# Patient Record
Sex: MALE | Race: WHITE | NOT HISPANIC OR LATINO | Employment: OTHER | URBAN - METROPOLITAN AREA
[De-identification: names, ages, dates, MRNs, and addresses within clinical notes are randomized per-mention and may not be internally consistent; named-entity substitution may affect disease eponyms.]

---

## 2024-05-10 ENCOUNTER — ANESTHESIA (OUTPATIENT)
Dept: SURGERY | Facility: CLINIC | Age: 64
DRG: 513 | End: 2024-05-10
Payer: COMMERCIAL

## 2024-05-10 ENCOUNTER — HOSPITAL ENCOUNTER (INPATIENT)
Facility: CLINIC | Age: 64
LOS: 3 days | Discharge: HOME HEALTH | DRG: 513 | End: 2024-05-13
Attending: STUDENT IN AN ORGANIZED HEALTH CARE EDUCATION/TRAINING PROGRAM | Admitting: INTERNAL MEDICINE
Payer: COMMERCIAL

## 2024-05-10 ENCOUNTER — ANESTHESIA EVENT (OUTPATIENT)
Dept: SURGERY | Facility: CLINIC | Age: 64
DRG: 513 | End: 2024-05-10
Payer: COMMERCIAL

## 2024-05-10 DIAGNOSIS — M79.645 PAIN IN FINGER OF LEFT HAND: ICD-10-CM

## 2024-05-10 DIAGNOSIS — M65.949 FLEXOR TENOSYNOVITIS OF FINGER: Primary | ICD-10-CM

## 2024-05-10 LAB
ANION GAP SERPL CALCULATED.3IONS-SCNC: 12 MMOL/L (ref 7–15)
BASOPHILS # BLD AUTO: 0 10E3/UL (ref 0–0.2)
BASOPHILS NFR BLD AUTO: 0 %
BUN SERPL-MCNC: 17.3 MG/DL (ref 8–23)
CALCIUM SERPL-MCNC: 9.6 MG/DL (ref 8.8–10.2)
CHLORIDE SERPL-SCNC: 103 MMOL/L (ref 98–107)
CREAT SERPL-MCNC: 0.87 MG/DL (ref 0.67–1.17)
DEPRECATED HCO3 PLAS-SCNC: 23 MMOL/L (ref 22–29)
EGFRCR SERPLBLD CKD-EPI 2021: >90 ML/MIN/1.73M2
EOSINOPHIL # BLD AUTO: 0.2 10E3/UL (ref 0–0.7)
EOSINOPHIL NFR BLD AUTO: 2 %
ERYTHROCYTE [DISTWIDTH] IN BLOOD BY AUTOMATED COUNT: 13.1 % (ref 10–15)
GLUCOSE SERPL-MCNC: 104 MG/DL (ref 70–99)
HCT VFR BLD AUTO: 37 % (ref 40–53)
HGB BLD-MCNC: 13.3 G/DL (ref 13.3–17.7)
IMM GRANULOCYTES # BLD: 0 10E3/UL
IMM GRANULOCYTES NFR BLD: 0 %
LYMPHOCYTES # BLD AUTO: 0.9 10E3/UL (ref 0.8–5.3)
LYMPHOCYTES NFR BLD AUTO: 12 %
MCH RBC QN AUTO: 30.8 PG (ref 26.5–33)
MCHC RBC AUTO-ENTMCNC: 35.9 G/DL (ref 31.5–36.5)
MCV RBC AUTO: 86 FL (ref 78–100)
MONOCYTES # BLD AUTO: 0.7 10E3/UL (ref 0–1.3)
MONOCYTES NFR BLD AUTO: 9 %
NEUTROPHILS # BLD AUTO: 5.4 10E3/UL (ref 1.6–8.3)
NEUTROPHILS NFR BLD AUTO: 77 %
NRBC # BLD AUTO: 0 10E3/UL
NRBC BLD AUTO-RTO: 0 /100
PLATELET # BLD AUTO: 173 10E3/UL (ref 150–450)
POTASSIUM SERPL-SCNC: 3.5 MMOL/L (ref 3.4–5.3)
RBC # BLD AUTO: 4.32 10E6/UL (ref 4.4–5.9)
SODIUM SERPL-SCNC: 138 MMOL/L (ref 135–145)
WBC # BLD AUTO: 7.1 10E3/UL (ref 4–11)

## 2024-05-10 PROCEDURE — 80048 BASIC METABOLIC PNL TOTAL CA: CPT | Performed by: STUDENT IN AN ORGANIZED HEALTH CARE EDUCATION/TRAINING PROGRAM

## 2024-05-10 PROCEDURE — 710N000009 HC RECOVERY PHASE 1, LEVEL 1, PER MIN: Performed by: ORTHOPAEDIC SURGERY

## 2024-05-10 PROCEDURE — 250N000009 HC RX 250: Performed by: ORTHOPAEDIC SURGERY

## 2024-05-10 PROCEDURE — 87205 SMEAR GRAM STAIN: CPT | Performed by: ORTHOPAEDIC SURGERY

## 2024-05-10 PROCEDURE — 99222 1ST HOSP IP/OBS MODERATE 55: CPT | Performed by: INTERNAL MEDICINE

## 2024-05-10 PROCEDURE — 250N000011 HC RX IP 250 OP 636: Performed by: ANESTHESIOLOGY

## 2024-05-10 PROCEDURE — 87075 CULTR BACTERIA EXCEPT BLOOD: CPT | Performed by: ORTHOPAEDIC SURGERY

## 2024-05-10 PROCEDURE — 272N000001 HC OR GENERAL SUPPLY STERILE: Performed by: ORTHOPAEDIC SURGERY

## 2024-05-10 PROCEDURE — 258N000003 HC RX IP 258 OP 636: Performed by: NURSE ANESTHETIST, CERTIFIED REGISTERED

## 2024-05-10 PROCEDURE — 250N000009 HC RX 250: Performed by: NURSE ANESTHETIST, CERTIFIED REGISTERED

## 2024-05-10 PROCEDURE — 250N000011 HC RX IP 250 OP 636: Performed by: INTERNAL MEDICINE

## 2024-05-10 PROCEDURE — 250N000011 HC RX IP 250 OP 636: Performed by: NURSE ANESTHETIST, CERTIFIED REGISTERED

## 2024-05-10 PROCEDURE — 250N000013 HC RX MED GY IP 250 OP 250 PS 637: Performed by: ANESTHESIOLOGY

## 2024-05-10 PROCEDURE — 120N000004 HC R&B MS OVERFLOW

## 2024-05-10 PROCEDURE — 250N000011 HC RX IP 250 OP 636: Performed by: STUDENT IN AN ORGANIZED HEALTH CARE EDUCATION/TRAINING PROGRAM

## 2024-05-10 PROCEDURE — 360N000076 HC SURGERY LEVEL 3, PER MIN: Performed by: ORTHOPAEDIC SURGERY

## 2024-05-10 PROCEDURE — 250N000025 HC SEVOFLURANE, PER MIN: Performed by: ORTHOPAEDIC SURGERY

## 2024-05-10 PROCEDURE — 250N000011 HC RX IP 250 OP 636: Performed by: ORTHOPAEDIC SURGERY

## 2024-05-10 PROCEDURE — 258N000003 HC RX IP 258 OP 636: Performed by: ANESTHESIOLOGY

## 2024-05-10 PROCEDURE — 99285 EMERGENCY DEPT VISIT HI MDM: CPT | Mod: 25

## 2024-05-10 PROCEDURE — 0LB80ZZ EXCISION OF LEFT HAND TENDON, OPEN APPROACH: ICD-10-PCS | Performed by: ORTHOPAEDIC SURGERY

## 2024-05-10 PROCEDURE — 85025 COMPLETE CBC W/AUTO DIFF WBC: CPT | Performed by: STUDENT IN AN ORGANIZED HEALTH CARE EDUCATION/TRAINING PROGRAM

## 2024-05-10 PROCEDURE — 87070 CULTURE OTHR SPECIMN AEROBIC: CPT | Performed by: ORTHOPAEDIC SURGERY

## 2024-05-10 PROCEDURE — 999N000141 HC STATISTIC PRE-PROCEDURE NURSING ASSESSMENT: Performed by: ORTHOPAEDIC SURGERY

## 2024-05-10 PROCEDURE — 370N000017 HC ANESTHESIA TECHNICAL FEE, PER MIN: Performed by: ORTHOPAEDIC SURGERY

## 2024-05-10 PROCEDURE — 36415 COLL VENOUS BLD VENIPUNCTURE: CPT | Performed by: STUDENT IN AN ORGANIZED HEALTH CARE EDUCATION/TRAINING PROGRAM

## 2024-05-10 RX ORDER — AMLODIPINE BESYLATE 10 MG/1
10 TABLET ORAL DAILY
Status: DISCONTINUED | OUTPATIENT
Start: 2024-05-11 | End: 2024-05-13 | Stop reason: HOSPADM

## 2024-05-10 RX ORDER — SODIUM CHLORIDE, SODIUM LACTATE, POTASSIUM CHLORIDE, CALCIUM CHLORIDE 600; 310; 30; 20 MG/100ML; MG/100ML; MG/100ML; MG/100ML
INJECTION, SOLUTION INTRAVENOUS CONTINUOUS
Status: DISCONTINUED | OUTPATIENT
Start: 2024-05-10 | End: 2024-05-11 | Stop reason: HOSPADM

## 2024-05-10 RX ORDER — GINSENG 100 MG
CAPSULE ORAL PRN
Status: DISCONTINUED | OUTPATIENT
Start: 2024-05-10 | End: 2024-05-10 | Stop reason: HOSPADM

## 2024-05-10 RX ORDER — LIDOCAINE 40 MG/G
CREAM TOPICAL
Status: DISCONTINUED | OUTPATIENT
Start: 2024-05-10 | End: 2024-05-11

## 2024-05-10 RX ORDER — ONDANSETRON 2 MG/ML
4 INJECTION INTRAMUSCULAR; INTRAVENOUS EVERY 30 MIN PRN
Status: DISCONTINUED | OUTPATIENT
Start: 2024-05-10 | End: 2024-05-11 | Stop reason: HOSPADM

## 2024-05-10 RX ORDER — CEFAZOLIN SODIUM 1 G/3ML
1 INJECTION, POWDER, FOR SOLUTION INTRAMUSCULAR; INTRAVENOUS EVERY 8 HOURS
Status: DISCONTINUED | OUTPATIENT
Start: 2024-05-10 | End: 2024-05-11

## 2024-05-10 RX ORDER — ACETAMINOPHEN 650 MG/1
650 SUPPOSITORY RECTAL EVERY 4 HOURS PRN
Status: DISCONTINUED | OUTPATIENT
Start: 2024-05-10 | End: 2024-05-13 | Stop reason: HOSPADM

## 2024-05-10 RX ORDER — HYDROMORPHONE HCL IN WATER/PF 6 MG/30 ML
0.2 PATIENT CONTROLLED ANALGESIA SYRINGE INTRAVENOUS EVERY 5 MIN PRN
Status: DISCONTINUED | OUTPATIENT
Start: 2024-05-10 | End: 2024-05-11 | Stop reason: HOSPADM

## 2024-05-10 RX ORDER — ACETAMINOPHEN 325 MG/1
650 TABLET ORAL EVERY 4 HOURS PRN
Status: DISCONTINUED | OUTPATIENT
Start: 2024-05-10 | End: 2024-05-11

## 2024-05-10 RX ORDER — ATORVASTATIN CALCIUM 20 MG/1
20 TABLET, FILM COATED ORAL DAILY
COMMUNITY

## 2024-05-10 RX ORDER — HYDROCHLOROTHIAZIDE 25 MG/1
25 TABLET ORAL DAILY
COMMUNITY

## 2024-05-10 RX ORDER — NALOXONE HYDROCHLORIDE 0.4 MG/ML
0.2 INJECTION, SOLUTION INTRAMUSCULAR; INTRAVENOUS; SUBCUTANEOUS
Status: DISCONTINUED | OUTPATIENT
Start: 2024-05-10 | End: 2024-05-13 | Stop reason: HOSPADM

## 2024-05-10 RX ORDER — LIDOCAINE HYDROCHLORIDE 20 MG/ML
INJECTION, SOLUTION INFILTRATION; PERINEURAL PRN
Status: DISCONTINUED | OUTPATIENT
Start: 2024-05-10 | End: 2024-05-10

## 2024-05-10 RX ORDER — PROPOFOL 10 MG/ML
INJECTION, EMULSION INTRAVENOUS PRN
Status: DISCONTINUED | OUTPATIENT
Start: 2024-05-10 | End: 2024-05-10

## 2024-05-10 RX ORDER — DEXAMETHASONE SODIUM PHOSPHATE 4 MG/ML
4 INJECTION, SOLUTION INTRA-ARTICULAR; INTRALESIONAL; INTRAMUSCULAR; INTRAVENOUS; SOFT TISSUE
Status: DISCONTINUED | OUTPATIENT
Start: 2024-05-10 | End: 2024-05-11 | Stop reason: HOSPADM

## 2024-05-10 RX ORDER — FLUTICASONE PROPIONATE AND SALMETEROL 250; 50 UG/1; UG/1
1 POWDER RESPIRATORY (INHALATION) 2 TIMES DAILY
COMMUNITY

## 2024-05-10 RX ORDER — LABETALOL HYDROCHLORIDE 5 MG/ML
10 INJECTION, SOLUTION INTRAVENOUS
Status: COMPLETED | OUTPATIENT
Start: 2024-05-10 | End: 2024-05-10

## 2024-05-10 RX ORDER — HYDROMORPHONE HCL IN WATER/PF 6 MG/30 ML
0.4 PATIENT CONTROLLED ANALGESIA SYRINGE INTRAVENOUS EVERY 5 MIN PRN
Status: DISCONTINUED | OUTPATIENT
Start: 2024-05-10 | End: 2024-05-11 | Stop reason: HOSPADM

## 2024-05-10 RX ORDER — CEFAZOLIN SODIUM 1 G/3ML
INJECTION, POWDER, FOR SOLUTION INTRAMUSCULAR; INTRAVENOUS PRN
Status: DISCONTINUED | OUTPATIENT
Start: 2024-05-10 | End: 2024-05-10

## 2024-05-10 RX ORDER — AMOXICILLIN 250 MG
2 CAPSULE ORAL 2 TIMES DAILY PRN
Status: DISCONTINUED | OUTPATIENT
Start: 2024-05-10 | End: 2024-05-13 | Stop reason: HOSPADM

## 2024-05-10 RX ORDER — ONDANSETRON 4 MG/1
4 TABLET, ORALLY DISINTEGRATING ORAL EVERY 6 HOURS PRN
Status: DISCONTINUED | OUTPATIENT
Start: 2024-05-10 | End: 2024-05-11

## 2024-05-10 RX ORDER — NALOXONE HYDROCHLORIDE 0.4 MG/ML
0.4 INJECTION, SOLUTION INTRAMUSCULAR; INTRAVENOUS; SUBCUTANEOUS
Status: DISCONTINUED | OUTPATIENT
Start: 2024-05-10 | End: 2024-05-13 | Stop reason: HOSPADM

## 2024-05-10 RX ORDER — ALBUTEROL SULFATE 90 UG/1
2 AEROSOL, METERED RESPIRATORY (INHALATION) EVERY 4 HOURS PRN
Status: DISCONTINUED | OUTPATIENT
Start: 2024-05-10 | End: 2024-05-13 | Stop reason: HOSPADM

## 2024-05-10 RX ORDER — VANCOMYCIN HYDROCHLORIDE 1 G/200ML
1000 INJECTION, SOLUTION INTRAVENOUS ONCE
Status: COMPLETED | OUTPATIENT
Start: 2024-05-10 | End: 2024-05-10

## 2024-05-10 RX ORDER — BUPIVACAINE HYDROCHLORIDE 2.5 MG/ML
INJECTION, SOLUTION INFILTRATION; PERINEURAL PRN
Status: DISCONTINUED | OUTPATIENT
Start: 2024-05-10 | End: 2024-05-10 | Stop reason: HOSPADM

## 2024-05-10 RX ORDER — MONTELUKAST SODIUM 10 MG/1
10 TABLET ORAL AT BEDTIME
COMMUNITY

## 2024-05-10 RX ORDER — SULFAMETHOXAZOLE/TRIMETHOPRIM 800-160 MG
1 TABLET ORAL 2 TIMES DAILY
Status: ON HOLD | COMMUNITY
End: 2024-05-13

## 2024-05-10 RX ORDER — ONDANSETRON 2 MG/ML
4 INJECTION INTRAMUSCULAR; INTRAVENOUS EVERY 6 HOURS PRN
Status: DISCONTINUED | OUTPATIENT
Start: 2024-05-10 | End: 2024-05-11

## 2024-05-10 RX ORDER — FENTANYL CITRATE 50 UG/ML
INJECTION, SOLUTION INTRAMUSCULAR; INTRAVENOUS PRN
Status: DISCONTINUED | OUTPATIENT
Start: 2024-05-10 | End: 2024-05-10

## 2024-05-10 RX ORDER — NALOXONE HYDROCHLORIDE 0.4 MG/ML
0.1 INJECTION, SOLUTION INTRAMUSCULAR; INTRAVENOUS; SUBCUTANEOUS
Status: DISCONTINUED | OUTPATIENT
Start: 2024-05-10 | End: 2024-05-11 | Stop reason: HOSPADM

## 2024-05-10 RX ORDER — LISINOPRIL 20 MG/1
20 TABLET ORAL DAILY
COMMUNITY

## 2024-05-10 RX ORDER — HYDRALAZINE HYDROCHLORIDE 20 MG/ML
2.5-5 INJECTION INTRAMUSCULAR; INTRAVENOUS EVERY 10 MIN PRN
Status: DISCONTINUED | OUTPATIENT
Start: 2024-05-10 | End: 2024-05-11 | Stop reason: HOSPADM

## 2024-05-10 RX ORDER — AMLODIPINE BESYLATE 10 MG/1
10 TABLET ORAL DAILY
COMMUNITY

## 2024-05-10 RX ORDER — ALBUTEROL SULFATE 90 UG/1
2 AEROSOL, METERED RESPIRATORY (INHALATION) EVERY 4 HOURS PRN
COMMUNITY

## 2024-05-10 RX ORDER — ONDANSETRON 2 MG/ML
INJECTION INTRAMUSCULAR; INTRAVENOUS PRN
Status: DISCONTINUED | OUTPATIENT
Start: 2024-05-10 | End: 2024-05-10

## 2024-05-10 RX ORDER — LISINOPRIL 20 MG/1
20 TABLET ORAL DAILY
Status: DISCONTINUED | OUTPATIENT
Start: 2024-05-11 | End: 2024-05-13 | Stop reason: HOSPADM

## 2024-05-10 RX ORDER — SODIUM CHLORIDE, SODIUM LACTATE, POTASSIUM CHLORIDE, CALCIUM CHLORIDE 600; 310; 30; 20 MG/100ML; MG/100ML; MG/100ML; MG/100ML
INJECTION, SOLUTION INTRAVENOUS CONTINUOUS PRN
Status: DISCONTINUED | OUTPATIENT
Start: 2024-05-10 | End: 2024-05-10

## 2024-05-10 RX ORDER — OXYCODONE HYDROCHLORIDE 5 MG/1
5 TABLET ORAL EVERY 4 HOURS PRN
Status: DISCONTINUED | OUTPATIENT
Start: 2024-05-10 | End: 2024-05-11

## 2024-05-10 RX ORDER — FENTANYL CITRATE 50 UG/ML
25 INJECTION, SOLUTION INTRAMUSCULAR; INTRAVENOUS EVERY 5 MIN PRN
Status: DISCONTINUED | OUTPATIENT
Start: 2024-05-10 | End: 2024-05-11 | Stop reason: HOSPADM

## 2024-05-10 RX ORDER — DEXAMETHASONE SODIUM PHOSPHATE 4 MG/ML
INJECTION, SOLUTION INTRA-ARTICULAR; INTRALESIONAL; INTRAMUSCULAR; INTRAVENOUS; SOFT TISSUE PRN
Status: DISCONTINUED | OUTPATIENT
Start: 2024-05-10 | End: 2024-05-10

## 2024-05-10 RX ORDER — CEFAZOLIN SODIUM 2 G/100ML
2 INJECTION, SOLUTION INTRAVENOUS ONCE
Status: COMPLETED | OUTPATIENT
Start: 2024-05-10 | End: 2024-05-10

## 2024-05-10 RX ORDER — CALCIUM CARBONATE 500 MG/1
1000 TABLET, CHEWABLE ORAL 4 TIMES DAILY PRN
Status: DISCONTINUED | OUTPATIENT
Start: 2024-05-10 | End: 2024-05-13 | Stop reason: HOSPADM

## 2024-05-10 RX ORDER — AMOXICILLIN 250 MG
1 CAPSULE ORAL 2 TIMES DAILY PRN
Status: DISCONTINUED | OUTPATIENT
Start: 2024-05-10 | End: 2024-05-13 | Stop reason: HOSPADM

## 2024-05-10 RX ORDER — FENTANYL CITRATE 50 UG/ML
50 INJECTION, SOLUTION INTRAMUSCULAR; INTRAVENOUS EVERY 5 MIN PRN
Status: DISCONTINUED | OUTPATIENT
Start: 2024-05-10 | End: 2024-05-11 | Stop reason: HOSPADM

## 2024-05-10 RX ORDER — FLUTICASONE FUROATE AND VILANTEROL 100; 25 UG/1; UG/1
1 POWDER RESPIRATORY (INHALATION) DAILY
Status: DISCONTINUED | OUTPATIENT
Start: 2024-05-10 | End: 2024-05-13 | Stop reason: HOSPADM

## 2024-05-10 RX ORDER — MONTELUKAST SODIUM 10 MG/1
10 TABLET ORAL AT BEDTIME
Status: DISCONTINUED | OUTPATIENT
Start: 2024-05-10 | End: 2024-05-13 | Stop reason: HOSPADM

## 2024-05-10 RX ORDER — ACETAMINOPHEN 325 MG/1
975 TABLET ORAL ONCE
Status: COMPLETED | OUTPATIENT
Start: 2024-05-10 | End: 2024-05-10

## 2024-05-10 RX ORDER — ALBUTEROL SULFATE 0.83 MG/ML
2.5 SOLUTION RESPIRATORY (INHALATION) EVERY 4 HOURS PRN
Status: DISCONTINUED | OUTPATIENT
Start: 2024-05-10 | End: 2024-05-11 | Stop reason: HOSPADM

## 2024-05-10 RX ORDER — ONDANSETRON 4 MG/1
4 TABLET, ORALLY DISINTEGRATING ORAL EVERY 30 MIN PRN
Status: DISCONTINUED | OUTPATIENT
Start: 2024-05-10 | End: 2024-05-11 | Stop reason: HOSPADM

## 2024-05-10 RX ADMIN — LABETALOL HYDROCHLORIDE 10 MG: 5 INJECTION, SOLUTION INTRAVENOUS at 23:21

## 2024-05-10 RX ADMIN — HYDRALAZINE HYDROCHLORIDE 5 MG: 20 INJECTION INTRAMUSCULAR; INTRAVENOUS at 23:18

## 2024-05-10 RX ADMIN — FENTANYL CITRATE 100 MCG: 50 INJECTION INTRAMUSCULAR; INTRAVENOUS at 21:13

## 2024-05-10 RX ADMIN — SODIUM CHLORIDE, POTASSIUM CHLORIDE, SODIUM LACTATE AND CALCIUM CHLORIDE: 600; 310; 30; 20 INJECTION, SOLUTION INTRAVENOUS at 21:08

## 2024-05-10 RX ADMIN — ONDANSETRON 4 MG: 2 INJECTION INTRAMUSCULAR; INTRAVENOUS at 21:56

## 2024-05-10 RX ADMIN — LIDOCAINE HYDROCHLORIDE 50 MG: 20 INJECTION, SOLUTION INFILTRATION; PERINEURAL at 21:13

## 2024-05-10 RX ADMIN — VANCOMYCIN HYDROCHLORIDE 1000 MG: 1 INJECTION, SOLUTION INTRAVENOUS at 22:48

## 2024-05-10 RX ADMIN — ACETAMINOPHEN 975 MG: 325 TABLET, FILM COATED ORAL at 22:55

## 2024-05-10 RX ADMIN — CEFAZOLIN 2 G: 1 INJECTION, POWDER, FOR SOLUTION INTRAMUSCULAR; INTRAVENOUS at 21:49

## 2024-05-10 RX ADMIN — HYDRALAZINE HYDROCHLORIDE 5 MG: 20 INJECTION INTRAMUSCULAR; INTRAVENOUS at 22:50

## 2024-05-10 RX ADMIN — HYDRALAZINE HYDROCHLORIDE 5 MG: 20 INJECTION INTRAMUSCULAR; INTRAVENOUS at 22:29

## 2024-05-10 RX ADMIN — DEXAMETHASONE SODIUM PHOSPHATE 8 MG: 4 INJECTION, SOLUTION INTRA-ARTICULAR; INTRALESIONAL; INTRAMUSCULAR; INTRAVENOUS; SOFT TISSUE at 21:13

## 2024-05-10 RX ADMIN — CEFAZOLIN SODIUM 2 G: 2 INJECTION, SOLUTION INTRAVENOUS at 15:02

## 2024-05-10 RX ADMIN — SODIUM CHLORIDE, POTASSIUM CHLORIDE, SODIUM LACTATE AND CALCIUM CHLORIDE: 600; 310; 30; 20 INJECTION, SOLUTION INTRAVENOUS at 22:47

## 2024-05-10 RX ADMIN — HYDRALAZINE HYDROCHLORIDE 5 MG: 20 INJECTION INTRAMUSCULAR; INTRAVENOUS at 23:08

## 2024-05-10 RX ADMIN — PROPOFOL 200 MG: 10 INJECTION, EMULSION INTRAVENOUS at 21:13

## 2024-05-10 ASSESSMENT — ACTIVITIES OF DAILY LIVING (ADL)
ADLS_ACUITY_SCORE: 35
ADLS_ACUITY_SCORE: 33
ADLS_ACUITY_SCORE: 35

## 2024-05-10 ASSESSMENT — COLUMBIA-SUICIDE SEVERITY RATING SCALE - C-SSRS
6. HAVE YOU EVER DONE ANYTHING, STARTED TO DO ANYTHING, OR PREPARED TO DO ANYTHING TO END YOUR LIFE?: NO
2. HAVE YOU ACTUALLY HAD ANY THOUGHTS OF KILLING YOURSELF IN THE PAST MONTH?: NO
1. IN THE PAST MONTH, HAVE YOU WISHED YOU WERE DEAD OR WISHED YOU COULD GO TO SLEEP AND NOT WAKE UP?: NO

## 2024-05-10 NOTE — PHARMACY-ADMISSION MEDICATION HISTORY
Pharmacy Intern Admission Medication History    Admission medication history is complete. The information provided in this note is only as accurate as the sources available at the time of the update.    Information Source(s): Patient, Family member, and CareEverywhere/SureScripts via in-person    Pertinent Information: Med list was collected from pt's wife. Today is the 3rd day of his BACTRIM 800-160 MG tab.    Changes made to PTA medication list:  Added: Whole list  Deleted: None  Changed: None    Allergies reviewed with patient and updates made in EHR: yes    Medication History Completed By: Nas Stevens 5/10/2024 4:17 PM    PTA Med List   Medication Sig Last Dose    albuterol (PROAIR HFA/PROVENTIL HFA/VENTOLIN HFA) 108 (90 Base) MCG/ACT inhaler Inhale 2 puffs into the lungs every 4 hours as needed for shortness of breath, wheezing or cough Unknown at -    amLODIPine (NORVASC) 10 MG tablet Take 10 mg by mouth daily 5/10/2024 at am    atorvastatin (LIPITOR) 20 MG tablet Take 20 mg by mouth daily 5/10/2024 at am    fluticasone-salmeterol (ADVAIR) 250-50 MCG/ACT inhaler Inhale 1 puff into the lungs 2 times daily 5/10/2024 at am    hydrochlorothiazide (HYDRODIURIL) 25 MG tablet Take 25 mg by mouth daily 5/10/2024 at am    lisinopril (ZESTRIL) 20 MG tablet Take 20 mg by mouth daily 5/10/2024 at am    montelukast (SINGULAIR) 10 MG tablet Take 10 mg by mouth at bedtime 5/9/2024 at pm    sulfamethoxazole-trimethoprim (BACTRIM DS) 800-160 MG tablet Take 1 tablet by mouth 2 times daily 5/10/2024 at am

## 2024-05-10 NOTE — ED NOTES
"[facility]  ED Nurse Handoff Report    ED Chief complaint: Hand Injury  . ED Diagnosis:   Final diagnoses:   None       Allergies: No Known Allergies    Code Status: Full Code    Activity level - Baseline/Home:  independent.  Activity Level - Current:   independent.   Lift room needed: No.   Bariatric: No   Needed: No   Isolation: No.   Infection: Not Applicable.     Respiratory status: Room air    Vital Signs (within 30 minutes):   Vitals:    05/10/24 1327 05/10/24 1500   BP: (!) 171/89 (!) 144/78   Pulse: 95    Resp: 18 16   Temp: 98.5  F (36.9  C)    TempSrc: Oral    SpO2: 97% 99%   Weight: 99.2 kg (218 lb 11.1 oz)    Height: 1.778 m (5' 10\")        Cardiac Rhythm:  ,      Pain level:    Patient confused: No.   Patient Falls Risk: patient and family education.   Elimination Status: Has voided     Patient Report - Initial Complaint: Left hand swelling and pain after nail in thumb.   Focused Assessment: left thumb red and swollen     Abnormal Results:   Labs Ordered and Resulted from Time of ED Arrival to Time of ED Departure   BASIC METABOLIC PANEL - Abnormal       Result Value    Sodium 138      Potassium 3.5      Chloride 103      Carbon Dioxide (CO2) 23      Anion Gap 12      Urea Nitrogen 17.3      Creatinine 0.87      GFR Estimate >90      Calcium 9.6      Glucose 104 (*)    CBC WITH PLATELETS AND DIFFERENTIAL - Abnormal    WBC Count 7.1      RBC Count 4.32 (*)     Hemoglobin 13.3      Hematocrit 37.0 (*)     MCV 86      MCH 30.8      MCHC 35.9      RDW 13.1      Platelet Count 173      % Neutrophils 77      % Lymphocytes 12      % Monocytes 9      % Eosinophils 2      % Basophils 0      % Immature Granulocytes 0      NRBCs per 100 WBC 0      Absolute Neutrophils 5.4      Absolute Lymphocytes 0.9      Absolute Monocytes 0.7      Absolute Eosinophils 0.2      Absolute Basophils 0.0      Absolute Immature Granulocytes 0.0      Absolute NRBCs 0.0          No orders to display "       Treatments provided: Abx, labs  Family Comments: NA  OBS brochure/video discussed/provided to patient:  No  ED Medications:   Medications   ceFAZolin (ANCEF) 2 g in 100 mL D5W intermittent infusion (2 g Intravenous $New Bag 5/10/24 0470)       Drips infusing:  No  For the majority of the shift this patient was Green.   Interventions performed were Abx.    Sepsis treatment initiated: No    Cares/treatment/interventions/medications to be completed following ED care: IV abx    ED Nurse Name: Vicky Mercado RN  3:03 PM  RECEIVING UNIT ED HANDOFF REVIEW    Above ED Nurse Handoff Report was reviewed: Yes  Reviewed by: Liz Schwartz RN on May 10, 2024 at 5:49 PM   I Gaston called the ED to inform them the note was read: Yes

## 2024-05-10 NOTE — PROGRESS NOTES
"Provided cares for pt from [id number]. Pt a/o x4. Denied pain. L pointer finger ands hand swollen and red. Plan to treat with IV abx. Independent, pt voided.     BP (!) 145/85 (BP Location: Right arm, Patient Position: Semi-Wang's, Cuff Size: Adult Regular)   Pulse 79   Temp 98.2  F (36.8  C) (Oral)   Resp 18   Ht 1.778 m (5' 10\")   Wt 99.2 kg (218 lb 11.1 oz)   SpO2 98%   BMI 31.38 kg/m           "

## 2024-05-10 NOTE — ED TRIAGE NOTES
Patient states on Monday he poked himself in his left hand with a nail. Patient states his hand swelled so he went to  who gave him antibiotics. Patient went for a recheck today and it has not gotten better.      Triage Assessment (Adult)       Row Name 05/10/24 1326          Triage Assessment    Airway WDL WDL        Respiratory WDL    Respiratory WDL WDL        Peripheral/Neurovascular WDL    Peripheral Neurovascular WDL WDL

## 2024-05-10 NOTE — H&P
[facility]    History and Physical - Hospitalist Service       Date of Admission:  5/10/2024    Assessment & Plan      Ge Collado is a 63 year old male with background history of hypertension, dyslipidemia, fatty liver disease, bronchial asthma, who presented here in the emergency room earlier due to increasing swelling and pain on his left hand over the course of several days duration.    Problem list:     #Concerns for flexor tenosynovitis of finger  #Left hand swelling  #Recent left hand injury with failed outpatient antibiotics approach with suspicion of underlying soft tissue injury and infection    -Patient failed outpatient approach with oral antibiotics as previously prescribed Bactrim  -Fortunately no clear evidence of systemic signs and symptoms of sepsis or worsening systemic infection  -Optimize pain control  -As needed APAP, minimize narcotics if able  -IV antibiotics will cover with Ancef  -Case was discussed with orthopedic service with plans for close monitoring, IV antibiotics and reevaluation in the next day if pursuing and needing operative intervention or approach  -Admit as inpatient  -Keep him n.p.o. effective midnight until reassessed and seen by orthopedics hand service    Addendum: seen by ortho and subsequently underwent irrigation and debridement of the flexor sheath      #Benign essential hypertension  -Currently hypertensive  -Will be able to resume his home regimen of ACE inhibitors, diuretics and amlodipine    #History of bronchial asthma  -Resume home inhalers and montelukast    #History of dyslipidemia  -On home regimen of statins     Diet: NPO per Anesthesia Guidelines for Procedure/Surgery Except for: Meds    DVT Prophylaxis: Pneumatic Compression Devices and Ambulate every shift  Alvares Catheter: Not present  Lines: None     Cardiac Monitoring: None  Code Status:  full    Clinically Significant Risk Factors Present on Admission                  #  "Hypertension: Home medication list includes antihypertensive(s)      # Obesity: Estimated body mass index is 31.38 kg/m  as calculated from the following:    Height as of this encounter: 1.778 m (5' 10\").    Weight as of this encounter: 99.2 kg (218 lb 11.1 oz).              Disposition Plan     Medically Ready for Discharge: Anticipated in 2-4 Days           Hilario Torres MD, MD  Hospitalist Service  [facility]  Securely message with [employer] (more info)  Text page via [facility] Paging/Directory     ______________________________________________________________________    Chief Complaint     Left hand pain and swelling    History is obtained from the patient  Discussion with emergency room physician  Review of electronic medical records    History of Present Illness   Ge Collado is a 63 year old male with background history of hypertension, dyslipidemia, fatty liver disease, bronchial asthma, who presented here in the emergency room earlier due to increasing swelling and pain on his left hand over the course of several days duration.  He mentioned that unfortunately he sustained a hand injury while working on a deck and a nail injured his left hand.  He was seen initially in urgent care setting and was provided with oral antibiotics with Bactrim.  He presented back for follow-up in the outpatient setting but due to worsening swelling on and not much improvement with oral antibiotics he was sent to the emergency room for further evaluation and care.  He endorses no ongoing nausea, vomiting, fevers, chills, mental status changes, shortness of breath, chest pain, abdominal pain, diarrhea nor any focal weakness.   Upon presentation he demonstrated stable hemodynamics with blood pressure levels at the hypertensive side, afebrile, and not requiring any oxygen support.  Initial labs did show reassuring complete blood count with no significant leukocytosis, normal metabolic panel.  Subsequent " imaging did not reveal any acute fracture or cortical destruction.    His case was discussed by emergency room service with orthopedic hand service with recommendations to continue IV antibiotics, formal evaluation and further monitoring if needing surgical approach and intervention hence this referral to our service for further care with hospitalization.            Past Medical History    As listed above    Past Surgical History   Previous mastectomy, right shoulder surgery    Prior to Admission Medications   Prior to Admission Medications   Prescriptions Last Dose Informant Patient Reported? Taking?   albuterol (PROAIR HFA/PROVENTIL HFA/VENTOLIN HFA) 108 (90 Base) MCG/ACT inhaler Unknown at -  Yes Yes   Sig: Inhale 2 puffs into the lungs every 4 hours as needed for shortness of breath, wheezing or cough   amLODIPine (NORVASC) 10 MG tablet 5/10/2024 at am  Yes Yes   Sig: Take 10 mg by mouth daily   atorvastatin (LIPITOR) 20 MG tablet 5/10/2024 at am  Yes Yes   Sig: Take 20 mg by mouth daily   fluticasone-salmeterol (ADVAIR) 250-50 MCG/ACT inhaler 5/10/2024 at am  Yes Yes   Sig: Inhale 1 puff into the lungs 2 times daily   hydrochlorothiazide (HYDRODIURIL) 25 MG tablet 5/10/2024 at am  Yes Yes   Sig: Take 25 mg by mouth daily   lisinopril (ZESTRIL) 20 MG tablet 5/10/2024 at am  Yes Yes   Sig: Take 20 mg by mouth daily   montelukast (SINGULAIR) 10 MG tablet 5/9/2024 at pm  Yes Yes   Sig: Take 10 mg by mouth at bedtime   sulfamethoxazole-trimethoprim (BACTRIM DS) 800-160 MG tablet 5/10/2024 at am  Yes Yes   Sig: Take 1 tablet by mouth 2 times daily      Facility-Administered Medications: None        Review of Systems    The 10 point Review of Systems is negative other than noted in the HPI or here.     Social History   I have reviewed this patient's social history and updated it with pertinent information if needed.         Family History           Allergies   No Known Allergies     Physical Exam   Vital Signs: Temp:  98.5  F (36.9  C) Temp src: Oral BP: (!) 144/78 Pulse: 95   Resp: 16 SpO2: 99 %      Weight: 218 lbs 11.14 oz    HEENT; Atraumatic, normocephalic, pinkish conjuctiva, pupils bilateral reactive   Skin: warm and moist, no rashes  Lungs: equal chest expansion, clear to auscultation, no wheezes, no stridor, no crackles,   Heart: normal rate, normal rhythm, no rubs or gallops.   Abdomen: normal bowel sounds, no tenderness, no peritoneal signs, no guarding  Extremities: no deformities,   I will refer you to the photographs as taken during early part of hospitalization for other details of his left hand                  Neuro; follow commands, alert and oriented x3, spontaneous speech, coherent, moves all extremities spontaneously  Psych; no hallucination, euthymic mood, not agitated      Medical Decision Making       45 MINUTES SPENT BY ME on the date of service doing chart review, history, exam, documentation & further activities per the note.  MANAGEMENT DISCUSSED with the following over the past 24 hours: Yes   NOTE(S)/MEDICAL RECORDS REVIEWED over the past 24 hours: Yes       Data     I have personally reviewed the following data over the past 24 hrs:    7.1  \   13.3   / 173     138 103 17.3 /  104 (H)   3.5 23 0.87 \       Imaging results reviewed over the past 24 hrs:   No results found for this or any previous visit (from the past 24 hour(s)).

## 2024-05-10 NOTE — ED PROVIDER NOTES
"  Emergency Department Note      History of Present Illness     Chief Complaint  Hand Injury      HPI  Ge Collado is a delightful 63 year old male with HTN and hyperlipidemia presenting with a hand injury. The patient states that he was speared with a nail into the palmar aspect of his left hand near his index finger on Monday. He went into urgent care and placed on antibiotics, 10 day course of Bactrim and intramuscular ceftriaxone, 1 g. He went back in today and they said he had to come to the ED. He states that the nail was valorie from a deck he was tearing apart. He states that the left second finger has been getting more red swollen throughout the week. He denies any fever or chills. He is right handed. He states his last tetanus was 2023.         Independent Historian  None. Only the patient provided history.    Review of External Notes  I personally reviewed notes from the patient's urgent care visit dated today. This provided me with information regarding patient's recent clinical course.  I personally reviewed notes from the patient's urgent care visit dated  5/8/2024 . This provided me with information regarding patient's initial presentation at urgent care.   Past Medical History   Medical History and Problem List  Colon polyp  HTN  Fatty liver disease  Hyperlipidemia  Asthma  Rosacea    Medications  Amlodipine  Lipitor  Albuterol  lisinopril    Surgical History   Right shoulder surgery  Vasectomy  Menisectomy  Physical Exam   Patient Vitals for the past 24 hrs:   BP Temp Temp src Pulse Resp SpO2 Height Weight   05/10/24 1830 (!) 166/92 98  F (36.7  C) -- 84 20 97 % -- --   05/10/24 1817 (!) 145/85 98.2  F (36.8  C) Oral 79 18 98 % -- --   05/10/24 1500 (!) 144/78 -- -- -- 16 99 % -- --   05/10/24 1327 (!) 171/89 98.5  F (36.9  C) Oral 95 18 97 % 1.778 m (5' 10\") 99.2 kg (218 lb 11.1 oz)      Physical Exam   Male appearing stated age, seated in chair.  Left index finger is swollen, erythematous, " tender to palpation over the flexor aspect of the finger, and with reduced range of motion secondary to edema.  Maximal range of motion as pictured in the first image below.  There is a small puncture wound over the dorsal aspect of the left palm, over the flexor tendon of the left index finger.  Patient has normal strength and sensation and brisk capillary refill in the finger.                      Diagnostics   Lab Results   Labs Ordered and Resulted from Time of ED Arrival to Time of ED Departure   BASIC METABOLIC PANEL - Abnormal       Result Value    Sodium 138      Potassium 3.5      Chloride 103      Carbon Dioxide (CO2) 23      Anion Gap 12      Urea Nitrogen 17.3      Creatinine 0.87      GFR Estimate >90      Calcium 9.6      Glucose 104 (*)    CBC WITH PLATELETS AND DIFFERENTIAL - Abnormal    WBC Count 7.1      RBC Count 4.32 (*)     Hemoglobin 13.3      Hematocrit 37.0 (*)     MCV 86      MCH 30.8      MCHC 35.9      RDW 13.1      Platelet Count 173      % Neutrophils 77      % Lymphocytes 12      % Monocytes 9      % Eosinophils 2      % Basophils 0      % Immature Granulocytes 0      NRBCs per 100 WBC 0      Absolute Neutrophils 5.4      Absolute Lymphocytes 0.9      Absolute Monocytes 0.7      Absolute Eosinophils 0.2      Absolute Basophils 0.0      Absolute Immature Granulocytes 0.0      Absolute NRBCs 0.0         EKG   No ECG performed.      Independent Interpretation  None  ED Course    Medications Administered  Medications   fluticasone-vilanterol (BREO ELLIPTA) 100-25 MCG/ACT inhaler 1 puff (has no administration in time range)   montelukast (SINGULAIR) tablet 10 mg (has no administration in time range)   lisinopril (ZESTRIL) tablet 20 mg (has no administration in time range)   amLODIPine (NORVASC) tablet 10 mg (has no administration in time range)   albuterol (PROVENTIL HFA/VENTOLIN HFA) inhaler (has no administration in time range)   lidocaine 1 % 0.1-1 mL (has no administration in time range)    lidocaine (LMX4) cream (has no administration in time range)   sodium chloride (PF) 0.9% PF flush 3 mL (has no administration in time range)   sodium chloride (PF) 0.9% PF flush 3 mL (has no administration in time range)   senna-docusate (SENOKOT-S/PERICOLACE) 8.6-50 MG per tablet 1 tablet (has no administration in time range)     Or   senna-docusate (SENOKOT-S/PERICOLACE) 8.6-50 MG per tablet 2 tablet (has no administration in time range)   calcium carbonate (TUMS) chewable tablet 1,000 mg (has no administration in time range)   acetaminophen (TYLENOL) tablet 650 mg (has no administration in time range)     Or   acetaminophen (TYLENOL) Suppository 650 mg (has no administration in time range)   oxyCODONE IR (ROXICODONE) half-tab 2.5 mg (has no administration in time range)   oxyCODONE (ROXICODONE) tablet 5 mg (has no administration in time range)   ondansetron (ZOFRAN ODT) ODT tab 4 mg (has no administration in time range)     Or   ondansetron (ZOFRAN) injection 4 mg (has no administration in time range)   ceFAZolin (ANCEF) 1 g vial to attach to  ml bag for ADULT or 50 ml bag for PEDS (has no administration in time range)   naloxone (NARCAN) injection 0.2 mg (has no administration in time range)     Or   naloxone (NARCAN) injection 0.4 mg (has no administration in time range)     Or   naloxone (NARCAN) injection 0.2 mg (has no administration in time range)     Or   naloxone (NARCAN) injection 0.4 mg (has no administration in time range)   ceFAZolin (ANCEF) 2 g in 100 mL D5W intermittent infusion (2 g Intravenous $New Bag 5/10/24 1501)       Procedures  Procedures   None performed    Discussion of Management  I discussed the patient's presentation, workup, assessment, plan and disposition with hospitalist Dr. Torres.    Social Determinants of Health adding to complexity of care  None.      ED Course  ED Course as of 05/10/24 1904   Fri May 10, 2024   1410 Obtained the patients history and performed initial  exam   1416 Spoke to Jeannine the PA from [facility] about the patients findings and next steps   1448 Jeannine told me that the surgeon wanted ancef   1623 I spoke to the hospitalist, Dr. Torres, who has agreed to admit the patient for continued evaluation and treatment.     Medical Decision Making / Diagnosis   CMS Diagnoses: None    MIPS     None    MDM  Patient presenting with swelling, redness and pain of the left index finger.  Vital signs are reassuring.  Clinical concern for flexor tenosynovitis with failure of outpatient therapy of ceftriaxone and Bactrim.  Consulted hand surgery, who recommended patient be observed in the hospital overnight with IV antibiotic therapy, cefazolin.  Patient has no leukocytosis, no SIRS criteria.  Patient was admitted to hospitalist for further evaluation and management.       Disposition  The patient was admitted to the hospital under the care of Dr. Torres.     ICD-10 Codes:    ICD-10-CM    1. Flexor tenosynovitis of finger  M65.9       2. Pain in finger of left hand  M79.645              [facility]  [employer]        Chano Ferrell MD  05/10/24 1980

## 2024-05-11 LAB
GLUCOSE BLDC GLUCOMTR-MCNC: 148 MG/DL (ref 70–99)
GRAM STAIN RESULT: NORMAL
GRAM STAIN RESULT: NORMAL
HGB BLD-MCNC: 13.2 G/DL (ref 13.3–17.7)

## 2024-05-11 PROCEDURE — 250N000011 HC RX IP 250 OP 636: Performed by: INTERNAL MEDICINE

## 2024-05-11 PROCEDURE — 99232 SBSQ HOSP IP/OBS MODERATE 35: CPT | Performed by: INTERNAL MEDICINE

## 2024-05-11 PROCEDURE — 250N000011 HC RX IP 250 OP 636: Performed by: ORTHOPAEDIC SURGERY

## 2024-05-11 PROCEDURE — 36415 COLL VENOUS BLD VENIPUNCTURE: CPT | Performed by: ORTHOPAEDIC SURGERY

## 2024-05-11 PROCEDURE — 250N000013 HC RX MED GY IP 250 OP 250 PS 637: Performed by: INTERNAL MEDICINE

## 2024-05-11 PROCEDURE — 258N000003 HC RX IP 258 OP 636: Performed by: ORTHOPAEDIC SURGERY

## 2024-05-11 PROCEDURE — 120N000004 HC R&B MS OVERFLOW

## 2024-05-11 PROCEDURE — 250N000013 HC RX MED GY IP 250 OP 250 PS 637: Performed by: ORTHOPAEDIC SURGERY

## 2024-05-11 PROCEDURE — 99222 1ST HOSP IP/OBS MODERATE 55: CPT | Performed by: SPECIALIST

## 2024-05-11 PROCEDURE — 85018 HEMOGLOBIN: CPT | Performed by: ORTHOPAEDIC SURGERY

## 2024-05-11 RX ORDER — OXYCODONE HYDROCHLORIDE 5 MG/1
5-10 TABLET ORAL EVERY 4 HOURS PRN
Qty: 15 TABLET | Refills: 0 | Status: SHIPPED | OUTPATIENT
Start: 2024-05-11

## 2024-05-11 RX ORDER — MAGNESIUM HYDROXIDE/ALUMINUM HYDROXICE/SIMETHICONE 120; 1200; 1200 MG/30ML; MG/30ML; MG/30ML
30 SUSPENSION ORAL EVERY 4 HOURS PRN
Status: DISCONTINUED | OUTPATIENT
Start: 2024-05-11 | End: 2024-05-13 | Stop reason: HOSPADM

## 2024-05-11 RX ORDER — HYDROCHLOROTHIAZIDE 25 MG/1
25 TABLET ORAL DAILY
Status: DISCONTINUED | OUTPATIENT
Start: 2024-05-11 | End: 2024-05-13 | Stop reason: HOSPADM

## 2024-05-11 RX ORDER — BISACODYL 10 MG
10 SUPPOSITORY, RECTAL RECTAL DAILY PRN
Status: DISCONTINUED | OUTPATIENT
Start: 2024-05-13 | End: 2024-05-13 | Stop reason: HOSPADM

## 2024-05-11 RX ORDER — HYDROMORPHONE HCL IN WATER/PF 6 MG/30 ML
0.2 PATIENT CONTROLLED ANALGESIA SYRINGE INTRAVENOUS
Status: DISCONTINUED | OUTPATIENT
Start: 2024-05-11 | End: 2024-05-13 | Stop reason: HOSPADM

## 2024-05-11 RX ORDER — HYDROXYZINE HYDROCHLORIDE 25 MG/1
25 TABLET, FILM COATED ORAL EVERY 6 HOURS PRN
Status: DISCONTINUED | OUTPATIENT
Start: 2024-05-11 | End: 2024-05-13 | Stop reason: HOSPADM

## 2024-05-11 RX ORDER — ACETAMINOPHEN 325 MG/1
975 TABLET ORAL EVERY 8 HOURS
Qty: 27 TABLET | Refills: 0 | Status: DISCONTINUED | OUTPATIENT
Start: 2024-05-11 | End: 2024-05-13 | Stop reason: HOSPADM

## 2024-05-11 RX ORDER — ASPIRIN 81 MG/1
81 TABLET ORAL 2 TIMES DAILY
Status: DISCONTINUED | OUTPATIENT
Start: 2024-05-11 | End: 2024-05-13 | Stop reason: HOSPADM

## 2024-05-11 RX ORDER — CEFAZOLIN SODIUM 2 G/100ML
2 INJECTION, SOLUTION INTRAVENOUS EVERY 8 HOURS
Status: DISCONTINUED | OUTPATIENT
Start: 2024-05-11 | End: 2024-05-12

## 2024-05-11 RX ORDER — POLYETHYLENE GLYCOL 3350 17 G/17G
17 POWDER, FOR SOLUTION ORAL DAILY
Status: DISCONTINUED | OUTPATIENT
Start: 2024-05-11 | End: 2024-05-13 | Stop reason: HOSPADM

## 2024-05-11 RX ORDER — AMOXICILLIN 250 MG
1 CAPSULE ORAL 2 TIMES DAILY
Status: DISCONTINUED | OUTPATIENT
Start: 2024-05-11 | End: 2024-05-13 | Stop reason: HOSPADM

## 2024-05-11 RX ORDER — ACETAMINOPHEN 325 MG/1
650 TABLET ORAL EVERY 4 HOURS PRN
Status: DISCONTINUED | OUTPATIENT
Start: 2024-05-13 | End: 2024-05-13 | Stop reason: HOSPADM

## 2024-05-11 RX ORDER — CEFAZOLIN SODIUM 2 G/100ML
2 INJECTION, SOLUTION INTRAVENOUS EVERY 8 HOURS
Status: DISCONTINUED | OUTPATIENT
Start: 2024-05-11 | End: 2024-05-11

## 2024-05-11 RX ORDER — HYDROMORPHONE HCL IN WATER/PF 6 MG/30 ML
0.4 PATIENT CONTROLLED ANALGESIA SYRINGE INTRAVENOUS
Status: DISCONTINUED | OUTPATIENT
Start: 2024-05-11 | End: 2024-05-13 | Stop reason: HOSPADM

## 2024-05-11 RX ORDER — LIDOCAINE 40 MG/G
CREAM TOPICAL
Status: DISCONTINUED | OUTPATIENT
Start: 2024-05-11 | End: 2024-05-13 | Stop reason: HOSPADM

## 2024-05-11 RX ORDER — ONDANSETRON 4 MG/1
4 TABLET, ORALLY DISINTEGRATING ORAL EVERY 6 HOURS PRN
Status: DISCONTINUED | OUTPATIENT
Start: 2024-05-11 | End: 2024-05-13 | Stop reason: HOSPADM

## 2024-05-11 RX ORDER — PROCHLORPERAZINE MALEATE 10 MG
10 TABLET ORAL EVERY 6 HOURS PRN
Status: DISCONTINUED | OUTPATIENT
Start: 2024-05-11 | End: 2024-05-13 | Stop reason: HOSPADM

## 2024-05-11 RX ORDER — SODIUM CHLORIDE, SODIUM LACTATE, POTASSIUM CHLORIDE, CALCIUM CHLORIDE 600; 310; 30; 20 MG/100ML; MG/100ML; MG/100ML; MG/100ML
INJECTION, SOLUTION INTRAVENOUS CONTINUOUS
Status: DISCONTINUED | OUTPATIENT
Start: 2024-05-11 | End: 2024-05-13 | Stop reason: HOSPADM

## 2024-05-11 RX ORDER — OXYCODONE HYDROCHLORIDE 5 MG/1
5 TABLET ORAL EVERY 4 HOURS PRN
Status: DISCONTINUED | OUTPATIENT
Start: 2024-05-11 | End: 2024-05-13 | Stop reason: HOSPADM

## 2024-05-11 RX ORDER — ONDANSETRON 2 MG/ML
4 INJECTION INTRAMUSCULAR; INTRAVENOUS EVERY 6 HOURS PRN
Status: DISCONTINUED | OUTPATIENT
Start: 2024-05-11 | End: 2024-05-13 | Stop reason: HOSPADM

## 2024-05-11 RX ORDER — ATORVASTATIN CALCIUM 20 MG/1
20 TABLET, FILM COATED ORAL DAILY
Status: DISCONTINUED | OUTPATIENT
Start: 2024-05-11 | End: 2024-05-13 | Stop reason: HOSPADM

## 2024-05-11 RX ORDER — IBUPROFEN 600 MG/1
600 TABLET, FILM COATED ORAL EVERY 6 HOURS PRN
Status: DISCONTINUED | OUTPATIENT
Start: 2024-05-11 | End: 2024-05-13 | Stop reason: HOSPADM

## 2024-05-11 RX ORDER — OXYCODONE HYDROCHLORIDE 10 MG/1
10 TABLET ORAL EVERY 4 HOURS PRN
Status: DISCONTINUED | OUTPATIENT
Start: 2024-05-11 | End: 2024-05-13 | Stop reason: HOSPADM

## 2024-05-11 RX ADMIN — LISINOPRIL 20 MG: 20 TABLET ORAL at 08:25

## 2024-05-11 RX ADMIN — ATORVASTATIN CALCIUM 20 MG: 20 TABLET, FILM COATED ORAL at 08:25

## 2024-05-11 RX ADMIN — SODIUM CHLORIDE, POTASSIUM CHLORIDE, SODIUM LACTATE AND CALCIUM CHLORIDE: 600; 310; 30; 20 INJECTION, SOLUTION INTRAVENOUS at 13:50

## 2024-05-11 RX ADMIN — ASPIRIN 81 MG: 81 TABLET, COATED ORAL at 08:24

## 2024-05-11 RX ADMIN — CEFAZOLIN SODIUM 2 G: 2 INJECTION, SOLUTION INTRAVENOUS at 21:55

## 2024-05-11 RX ADMIN — MONTELUKAST 10 MG: 10 TABLET, FILM COATED ORAL at 21:55

## 2024-05-11 RX ADMIN — CEFAZOLIN 1 G: 1 INJECTION, POWDER, FOR SOLUTION INTRAMUSCULAR; INTRAVENOUS at 06:14

## 2024-05-11 RX ADMIN — AMLODIPINE BESYLATE 10 MG: 10 TABLET ORAL at 08:25

## 2024-05-11 RX ADMIN — ACETAMINOPHEN 975 MG: 325 TABLET, FILM COATED ORAL at 21:55

## 2024-05-11 RX ADMIN — HYDROCHLOROTHIAZIDE 25 MG: 25 TABLET ORAL at 08:25

## 2024-05-11 RX ADMIN — ASPIRIN 81 MG: 81 TABLET, COATED ORAL at 20:15

## 2024-05-11 RX ADMIN — ACETAMINOPHEN 975 MG: 325 TABLET, FILM COATED ORAL at 06:15

## 2024-05-11 RX ADMIN — ACETAMINOPHEN 975 MG: 325 TABLET, FILM COATED ORAL at 13:50

## 2024-05-11 RX ADMIN — CEFAZOLIN SODIUM 2 G: 2 INJECTION, SOLUTION INTRAVENOUS at 13:57

## 2024-05-11 RX ADMIN — CEFAZOLIN SODIUM 2 G: 2 INJECTION, SOLUTION INTRAVENOUS at 00:48

## 2024-05-11 ASSESSMENT — ACTIVITIES OF DAILY LIVING (ADL)
ADLS_ACUITY_SCORE: 36
WEAR_GLASSES_OR_BLIND: NO
ADLS_ACUITY_SCORE: 36

## 2024-05-11 NOTE — ANESTHESIA CARE TRANSFER NOTE
Patient: Ge Collado    Procedure: Procedure(s):  Incision and drainage of left index flexor sheath       Diagnosis: Infection [B99.9]  Diagnosis Additional Information: No value filed.    Anesthesia Type:   General     Note:    Oropharynx: oropharynx clear of all foreign objects and spontaneously breathing  Level of Consciousness: awake  Oxygen Supplementation: face mask  Level of Supplemental Oxygen (L/min / FiO2): 6  Independent Airway: airway patency satisfactory and stable  Dentition: dentition unchanged  Vital Signs Stable: post-procedure vital signs reviewed and stable  Report to RN Given: handoff report given  Patient transferred to: PACU    Handoff Report: Identifed the Patient, Identified the Reponsible Provider, Reviewed the pertinent medical history, Discussed the surgical course, Reviewed Intra-OP anesthesia mangement and issues during anesthesia, Set expectations for post-procedure period and Allowed opportunity for questions and acknowledgement of understanding      Vitals:  Vitals Value Taken Time   BP     Temp     Pulse 85 05/10/24 2211   Resp 12 05/10/24 2211   SpO2 100 % 05/10/24 2211   Vitals shown include unfiled device data.    Electronically Signed By: JAVIER Rosado CRNA  May 10, 2024  10:12 PM

## 2024-05-11 NOTE — CONSULTS
[facility]    Infectious Disease Consultation     Date of Admission:  5/10/2024  Date of Consult : 05/11/24    Assessment:  63YM who has been admitted with swelling and pain on his left hand over the course of several days and was found to have left index flexor tenosynovitis. S/p operative debridement on 5/10, purulence was noted in the flexor sheath and cultures are awaited.    -L index finger flexor tenosynovitis. S/p operative debridement, cxs are pending  -Chronic medical conditions - HTN, asthma, dyslipidemia    Recommendations:  Await operative cxs  Maintain on Cefazolin  Will likely need IV antibiotics at discharge for a couple weeks for flexor tenosynovitis  Final antimicrobial recommendations will be made based on cx data     Maria C Neves MD    Reason for Consult   Reason for consult: I was asked to evaluate this patient for antimicrobial recommendations for flexor tenosynovitis.    Primary Care Physician   [facility]    Chief Complaint   L hand swelling and index finger pain    History is obtained from the patient and medical records    History of Present Illness   Ge Collado is a 63 year old male who has been admitted with swelling and pain on his left hand over the course of several days and was found to have left index flexor tenosynovitis. S/p operative debridement on 5/10, purulence was noted in the flexor sheath and cultures are awaited    Patient sustained a hand injury while working on a deck and a nail injured his left hand. He was seen initially at urgent and was treated with oral Bactrim. He presented with worsening swelling and failed oral antimicrobial therapy. No fevers, chills,  shortness of breath or other systemic complaints.    Feels better today, pain controlled, tolerating antibiotics without side effects    Antimicrobial therapy  5/10 Cefazolin, Vancomycin    Past Medical History   I have reviewed this patient's medical history and updated it  with pertinent information if needed.   History reviewed. No pertinent past medical history.    Past Surgical History   I have reviewed this patient's surgical history and updated it with pertinent information if needed.  History reviewed. No pertinent surgical history.    Prior to Admission Medications   Prior to Admission Medications   Prescriptions Last Dose Informant Patient Reported? Taking?   albuterol (PROAIR HFA/PROVENTIL HFA/VENTOLIN HFA) 108 (90 Base) MCG/ACT inhaler Unknown at -  Yes Yes   Sig: Inhale 2 puffs into the lungs every 4 hours as needed for shortness of breath, wheezing or cough   amLODIPine (NORVASC) 10 MG tablet 5/10/2024 at am  Yes Yes   Sig: Take 10 mg by mouth daily   atorvastatin (LIPITOR) 20 MG tablet 5/10/2024 at am  Yes Yes   Sig: Take 20 mg by mouth daily   fluticasone-salmeterol (ADVAIR) 250-50 MCG/ACT inhaler 5/10/2024 at am  Yes Yes   Sig: Inhale 1 puff into the lungs 2 times daily   hydrochlorothiazide (HYDRODIURIL) 25 MG tablet 5/10/2024 at am  Yes Yes   Sig: Take 25 mg by mouth daily   lisinopril (ZESTRIL) 20 MG tablet 5/10/2024 at am  Yes Yes   Sig: Take 20 mg by mouth daily   montelukast (SINGULAIR) 10 MG tablet 5/9/2024 at pm  Yes Yes   Sig: Take 10 mg by mouth at bedtime   sulfamethoxazole-trimethoprim (BACTRIM DS) 800-160 MG tablet 5/10/2024 at am  Yes Yes   Sig: Take 1 tablet by mouth 2 times daily      Facility-Administered Medications: None     Allergies   No Known Allergies    Immunization History   Immunization History   Administered Date(s) Administered    COVID-19 12+ (2023-24) (MODERNA) 12/20/2023    COVID-19 Bivalent 18+ (Moderna) 11/16/2022    COVID-19 MONOVALENT 12+ (Pfizer) 04/14/2021, 05/05/2021       Social History   I have reviewed this patient's social history and updated it with pertinent information if needed. Ge Collado      Family History   I have reviewed this patient's family history and updated it with pertinent information if needed.   History  reviewed. No pertinent family history.    Review of Systems    Is as per HPI    Physical Exam   Temp: 98.1  F (36.7  C) Temp src: Oral BP: (!) 155/83 Pulse: 92   Resp: 18 SpO2: 95 % O2 Device: None (Room air) Oxygen Delivery: 6 LPM  Vital Signs with Ranges  Temp:  [96.9  F (36.1  C)-98.5  F (36.9  C)] 98.1  F (36.7  C)  Pulse:  [79-95] 92  Resp:  [12-25] 18  BP: (142-182)/(70-98) 155/83  SpO2:  [95 %-100 %] 95 %  218 lbs 11.14 oz  Body mass index is 31.38 kg/m .    GENERAL APPEARANCE:  awake  EYES: Eyes grossly normal to inspection  RESP: lungs clear   MS:L hand in dressing  SKIN: no rash    Data   All laboratory data reviewed  Component      Latest Ref Rn 5/10/2024  2:25 PM   WBC      4.0 - 11.0 10e3/uL 7.1    RBC Count      4.40 - 5.90 10e6/uL 4.32 (L)    Hemoglobin      13.3 - 17.7 g/dL 13.3    Hematocrit      40.0 - 53.0 % 37.0 (L)    MCV      78 - 100 fL 86    MCH      26.5 - 33.0 pg 30.8    MCHC      31.5 - 36.5 g/dL 35.9    RDW      10.0 - 15.0 % 13.1    Platelet Count      150 - 450 10e3/uL 173       Component      Latest Ref Rn 5/10/2024  2:25 PM   Sodium      135 - 145 mmol/L 138    Potassium      3.4 - 5.3 mmol/L 3.5    Chloride      98 - 107 mmol/L 103    Carbon Dioxide (CO2)      22 - 29 mmol/L 23    Anion Gap      7 - 15 mmol/L 12    Urea Nitrogen      8.0 - 23.0 mg/dL 17.3    Creatinine      0.67 - 1.17 mg/dL 0.87    GFR Estimate      >60 mL/min/1.73m2 >90    Calcium      8.8 - 10.2 mg/dL 9.6    Glucose      70 - 99 mg/dL 104 (H)       Microbiology  5/10 L hand swab cx pending

## 2024-05-11 NOTE — PLAN OF CARE
"  Problem: Adult Inpatient Plan of Care  Goal: Plan of Care Review  Description: The Plan of Care Review/Shift note should be completed every shift.  The Outcome Evaluation is a brief statement about your assessment that the patient is improving, declining, or no change.  This information will be displayed automatically on your shift  note.  Outcome: Progressing  Goal: Patient-Specific Goal (Individualized)  Description: You can add care plan individualizations to a care plan. Examples of Individualization might be:  \"Parent requests to be called daily at 9am for status\", \"I have a hard time hearing out of my right ear\", or \"Do not touch me to wake me up as it startles  me\".  Outcome: Progressing  Goal: Absence of Hospital-Acquired Illness or Injury  Outcome: Progressing  Intervention: Prevent Skin Injury  Recent Flowsheet Documentation  Taken 5/11/2024 0000 by Jose Payne RN  Body Position: supine, head elevated  Goal: Optimal Comfort and Wellbeing  Outcome: Progressing  Goal: Readiness for Transition of Care  Outcome: Progressing     Problem: Infection  Goal: Absence of Infection Signs and Symptoms  Outcome: Progressing     Pt alert and oriented x4, up independently in room. VSS. PIV to RUE infusing LR @ 75mL/hr. Dressing to ANGELITA MONTGOMERY, CMS intact. Will continue current POC.    "

## 2024-05-11 NOTE — PROGRESS NOTES
"[facility]    Medicine Progress Note - Hospitalist Service    Date of Admission:  5/10/2024    Assessment & Plan      Ge Collado is a 63 year old male with background history of hypertension, dyslipidemia, fatty liver disease, bronchial asthma, who presented here in the emergency room earlier due to increasing swelling and pain on his left hand over the course of several days duration.    Problem list:  #Infected left index flexor sheath  #Postoperative state status post irrigation debridement of the flexor sheath with drainage of luly pus    -Continue current inpatient care  -Receiving IV antibiotics  -Appreciate prompt input from ID service  -Will follow cultures as sent earlier  -Optimize pain  control, minimize narcotics if able  -On scheduled APAP  -Orthopedic hand service following with us    #Benign essential hypertension  -Resumption of patient's HCTZ and lisinopril    #History of bronchial asthma  -Resume inhalers and montelukast    #History of dyslipidemia  -On statins        Diet: Advance Diet as Tolerated: Regular Diet Adult    DVT Prophylaxis: Pneumatic Compression Devices and Ambulate every shift  Alvares Catheter: Not present  Lines: None     Cardiac Monitoring: None  Code Status: Full Code      Clinically Significant Risk Factors Present on Admission                  # Hypertension: Home medication list includes antihypertensive(s)      # Obesity: Estimated body mass index is 31.38 kg/m  as calculated from the following:    Height as of this encounter: 1.778 m (5' 10\").    Weight as of this encounter: 99.2 kg (218 lb 11.1 oz).              Disposition Plan     Medically Ready for Discharge: Anticipated Tomorrow             Hilario Torres MD, MD  Hospitalist Service  [facility]  Securely message with [employer] (more info)  Text page via [employer] Paging/Directory   ______________________________________________________________________    Interval " History   Continuing medicine service care.  Chart reviewed  Stable hemodynamics.  Afebrile  Currently not requiring any oxygen support  Tolerated operative intervention overnight.    Physical Exam   Vital Signs: Temp: 98.1  F (36.7  C) Temp src: Oral BP: (!) 155/83 Pulse: 92   Resp: 18 SpO2: 95 % O2 Device: None (Room air) Oxygen Delivery: 6 LPM  Weight: 218 lbs 11.14 oz    HEENT; Atraumatic, normocephalic, pinkish conjuctiva, pupils bilateral reactive   Skin: warm and moist, no rashes  Dressing in place left hand  Lymphatics: no cervical or axillary lymphandenopathy  Lungs: equal chest expansion, clear to auscultation, no wheezes, no stridor, no crackles,   Heart: normal rate, normal rhythm, no rubs or gallops.   Abdomen: normal bowel sounds, no tenderness, no peritoneal signs, no guarding  Extremities: no deformities, no edema   Neuro; follow commands, alert and oriented x3, spontaneous speech, coherent, moves all extremities spontaneously  Psych; no hallucination, euthymic mood, not agitated      Medical Decision Making       50 MINUTES SPENT BY ME on the date of service doing chart review, history, exam, documentation & further activities per the note.  MANAGEMENT DISCUSSED with the following over the past 24 hours: Yes   NOTE(S)/MEDICAL RECORDS REVIEWED over the past 24 hours: Yes       Data     I have personally reviewed the following data over the past 24 hrs:    7.1  \   13.3   / 173     138 103 17.3 /  148 (H)   3.5 23 0.87 \       Imaging results reviewed over the past 24 hrs:   No results found for this or any previous visit (from the past 24 hour(s)).

## 2024-05-11 NOTE — PLAN OF CARE
Goal Outcome Evaluation:      Plan of Care Reviewed With: patient    Overall Patient Progress: improvingOverall Patient Progress: improving         VSS. Afebrile.   Denies pain.   Dressing c/d/I. Good cap refill to fingers. Denies numbness or tingling.   Eating and drinking well.  Voiding.  Ambulating halls.       Problem: Adult Inpatient Plan of Care  Goal: Plan of Care Review  Description: The Plan of Care Review/Shift note should be completed every shift.  The Outcome Evaluation is a brief statement about your assessment that the patient is improving, declining, or no change.  This information will be displayed automatically on your shift  note.  Outcome: Progressing  Flowsheets (Taken 5/11/2024 1822)  Plan of Care Reviewed With: patient  Overall Patient Progress: improving  Goal: Absence of Hospital-Acquired Illness or Injury  Intervention: Prevent Skin Injury  Recent Flowsheet Documentation  Taken 5/11/2024 0800 by Yola Lopez, RN  Body Position: supine, head elevated     Problem: Skin Injury Risk Increased  Goal: Skin Health and Integrity  Intervention: Optimize Skin Protection  Recent Flowsheet Documentation  Taken 5/11/2024 0800 by Yola Lopez RN  Activity Management: activity adjusted per tolerance  Head of Bed (HOB) Positioning: HOB at 30-45 degrees

## 2024-05-11 NOTE — OP NOTE
Surgeon: Dr. Nadiya Pereyra MD.  First assistant: none      Preoperative diagnosis: Infected left index flexor sheath    Post-operative diagnosis: luly pus in the flexor sheath and necrotic fat and loss of A2 pulley from infection    Procedure: Irrigation and Debridement of flexor sheath    Anesthesia: General    Tourniquet time: 22 minutes    Informed consent: Informed consent was obtained in the holding area.    Surgical site signed: The surgical site was signed by me prior to entering the OR.    Time out: A time-out was performed confirming the surgical site prior to incision.    Prophylactic antibiotics: held until cultures obtained    DVT indications: none    Procedure and findings:The patient was brought to the operating room and given a general anesthetic.  His left arm was prepped and draped with Betadine scrub and Betadine paint and the tourniquet was inflated 150 mm above his systolic pressure.    A V-type incision was made beginning on the ulnar side of the proximal phalanx the apex was at the MP crease of the finger and the incision was carried back to the proximal palmar crease.  The skin was elevated up and retracted ulnarly with a 4-0 nylon.  Luly pus and necrotic fat was immediately evident and this was excised with a rongeur and tenotomy scissors specimens were sent to path.  A portion of the flexor sheath had been eroded by the infection.  There was luly pus and bloody synovial fluid in the flexor sheath.  A second incision was made at the DIP crease again with the apex radial and the skin was elevated up to the ulnar side of the finger.  The flexor sheath at this level looked good and the flexor tendon was completely intact.  A propofol tubing from anesthesia was then threaded beneath the A1 pulley through the remnant of the A2 pulley and then down into the cruciate and A4 pulley area.  The flexor sheath was irrigated out copiously with the antibiotic saline solution.  After several 100 cc of  irrigation I then debrided and the unhealthy fat or soft tissue.  He did seem to have extension of the infection along the ulnar side of the finger.  Once everything looked good I then irrigated Marcaine through the flexor sheath and then let the tourniquet down.  Any small skin bleeders were cauterized.  The skin was closed with 4-0 nylon in a mattress stitch fashion.  Portions of each wound were left open to allow for drainage.  Bacitracin Adaptic and a sterile dressing was applied; the patient was transferred to recovery room in stable condition.    .

## 2024-05-11 NOTE — ANESTHESIA PREPROCEDURE EVALUATION
"Anesthesia Pre-Procedure Evaluation    Patient: Ge Collado   MRN: [id number] : 1960        Procedure : Procedure(s):  Incision and drainage finger, combined          No past medical history on file.   No past surgical history on file.   No Known Allergies   Social History     Tobacco Use    Smoking status: Not on file    Smokeless tobacco: Not on file   Substance Use Topics    Alcohol use: Not on file      Wt Readings from Last 1 Encounters:   05/10/24 99.2 kg (218 lb 11.1 oz)        Anesthesia Evaluation            ROS/MED HX  ENT/Pulmonary:     (+)                      asthma                  Neurologic:       Cardiovascular:     (+)  hypertension- -   -  - -                                      METS/Exercise Tolerance:     Hematologic:       Musculoskeletal:       GI/Hepatic:       Renal/Genitourinary:       Endo:     (+)               Obesity (BMI 31),       Psychiatric/Substance Use:       Infectious Disease:       Malignancy:       Other:            Physical Exam    Airway        Mallampati: I   TM distance: > 3 FB   Neck ROM: full   Mouth opening: > 3 cm    Respiratory Devices and Support         Dental           Cardiovascular   cardiovascular exam normal          Pulmonary   pulmonary exam normal                OUTSIDE LABS:  CBC:   Lab Results   Component Value Date    WBC 7.1 05/10/2024    HGB 13.3 05/10/2024    HCT 37.0 (L) 05/10/2024     05/10/2024     BMP:   Lab Results   Component Value Date     05/10/2024    POTASSIUM 3.5 05/10/2024    CHLORIDE 103 05/10/2024    CO2 23 05/10/2024    BUN 17.3 05/10/2024    CR 0.87 05/10/2024     (H) 05/10/2024     COAGS: No results found for: \"PTT\", \"INR\", \"FIBR\"  POC: No results found for: \"BGM\", \"HCG\", \"HCGS\"  HEPATIC: No results found for: \"ALBUMIN\", \"PROTTOTAL\", \"ALT\", \"AST\", \"GGT\", \"ALKPHOS\", \"BILITOTAL\", \"BILIDIRECT\", \"ONEIL\"  OTHER:   Lab Results   Component Value Date    DOMINGA 9.6 05/10/2024       Anesthesia Plan    ASA Status:  " "2, emergent    NPO Status:  NPO Appropriate    Anesthesia Type: General.     - Airway: LMA   Induction: Intravenous.   Maintenance: Balanced.        Consents    Anesthesia Plan(s) and associated risks, benefits, and realistic alternatives discussed. Questions answered and patient/representative(s) expressed understanding.     - Discussed:     - Discussed with:  Patient            Postoperative Care    Pain management: IV analgesics, Oral pain medications, Multi-modal analgesia.   PONV prophylaxis: Ondansetron (or other 5HT-3), Dexamethasone or Solumedrol     Comments:               Jenny Richardson MD    I have reviewed the pertinent notes and labs in the chart from the past 30 days and (re)examined the patient.  Any updates or changes from those notes are reflected in this note.              # Obesity: Estimated body mass index is 31.38 kg/m  as calculated from the following:    Height as of this encounter: 1.778 m (5' 10\").    Weight as of this encounter: 99.2 kg (218 lb 11.1 oz).      "

## 2024-05-11 NOTE — ANESTHESIA POSTPROCEDURE EVALUATION
Patient: Ge Collado    Procedure: Procedure(s):  Incision and drainage of left index flexor sheath       Anesthesia Type:  General    Note:  Disposition: Inpatient   Postop Pain Control: Uneventful            Sign Out: Well controlled pain   PONV: No   Neuro/Psych: Uneventful            Sign Out: Acceptable/Baseline neuro status   Airway/Respiratory: Uneventful            Sign Out: Acceptable/Baseline resp. status   CV/Hemodynamics: Uneventful            Sign Out: Acceptable CV status; No obvious hypovolemia; No obvious fluid overload   Other NRE:    DID A NON-ROUTINE EVENT OCCUR? No           Last vitals:  Vitals Value Taken Time   /88 05/10/24 2235   Temp 96.9  F (36.1  C) 05/10/24 2209   Pulse 81 05/10/24 2237   Resp 6 05/10/24 2237   SpO2 99 % 05/10/24 2237   Vitals shown include unfiled device data.    Electronically Signed By: Jenny Richardson MD  May 10, 2024  10:38 PM

## 2024-05-11 NOTE — ANESTHESIA PROCEDURE NOTES
Airway       Patient location during procedure: OR       Procedure Start/Stop Times: 5/10/2024 9:15 PM  Staff -        CRNA: Jaleel Alberto APRN CRNA       Performed By: CRNA  Consent for Airway        Urgency: elective  Indications and Patient Condition       Indications for airway management: gretchen-procedural       Induction type:intravenous       Mask difficulty assessment: 1 - vent by mask    Final Airway Details       Final airway type: supraglottic airway    Supraglottic Airway Details        Type: LMA       Brand: I-Gel       LMA size: 5    Post intubation assessment        Placement verified by: capnometry, equal breath sounds and chest rise        Number of attempts at approach: 1       Number of other approaches attempted: 0       Secured with: commercial tube schumacher       Ease of procedure: easy       Dentition: Intact    Medication(s) Administered   Medication Administration Time: 5/10/2024 9:15 PM

## 2024-05-11 NOTE — PROGRESS NOTES
"Ge Collado  2024  POD # 1 s/p I&D left index finger  Admit Date:  5/10/2024      Doing well.  No immediate surgical complications identified.  No excessive bleeding  Pain well-controlled.  Objective:  Blood pressure (!) 152/81, pulse 92, temperature 97.6  F (36.4  C), temperature source Oral, resp. rate 16, height 1.778 m (5' 10\"), weight 99.2 kg (218 lb 11.1 oz), SpO2 97%.    Temperatures:  Current - Temp: 97.6  F (36.4  C); Max - Temp  Av.8  F (36.6  C)  Min: 96.9  F (36.1  C)  Max: 98.5  F (36.9  C)  Pulse range: Pulse  Av.2  Min: 79  Max: 95  Blood pressure range: Systolic (24hrs), Av , Min:142 , Max:182   ; Diastolic (24hrs), Av, Min:70, Max:98    Exam:  CMS: intact  alert, stable, wound ok  Communicates clearly with examiner  Pain improved  Brisk capillary refill all digits LUE  Dressing/splint c/d/i    Labs:  Recent Labs   Lab Test 05/10/24  1425   POTASSIUM 3.5     Recent Labs   Lab Test 24  0817 05/10/24  1425   HGB 13.2* 13.3     No results for input(s): \"INR\" in the last 38472 hours.  Recent Labs   Lab Test 05/10/24  1425          PLAN: continue abx. Waiting final results from surgery. Continue pain control. Anticipate discharge to home tomorrow.     "

## 2024-05-12 LAB
GLUCOSE SERPL-MCNC: 115 MG/DL (ref 70–99)
HGB BLD-MCNC: 12.3 G/DL (ref 13.3–17.7)
POTASSIUM SERPL-SCNC: 4 MMOL/L (ref 3.4–5.3)

## 2024-05-12 PROCEDURE — 99232 SBSQ HOSP IP/OBS MODERATE 35: CPT | Performed by: INTERNAL MEDICINE

## 2024-05-12 PROCEDURE — 82947 ASSAY GLUCOSE BLOOD QUANT: CPT | Performed by: INTERNAL MEDICINE

## 2024-05-12 PROCEDURE — 84132 ASSAY OF SERUM POTASSIUM: CPT | Performed by: INTERNAL MEDICINE

## 2024-05-12 PROCEDURE — 250N000013 HC RX MED GY IP 250 OP 250 PS 637: Performed by: INTERNAL MEDICINE

## 2024-05-12 PROCEDURE — 250N000011 HC RX IP 250 OP 636: Performed by: INTERNAL MEDICINE

## 2024-05-12 PROCEDURE — 36415 COLL VENOUS BLD VENIPUNCTURE: CPT | Performed by: INTERNAL MEDICINE

## 2024-05-12 PROCEDURE — 85018 HEMOGLOBIN: CPT | Performed by: ORTHOPAEDIC SURGERY

## 2024-05-12 PROCEDURE — 258N000003 HC RX IP 258 OP 636: Performed by: SPECIALIST

## 2024-05-12 PROCEDURE — 99232 SBSQ HOSP IP/OBS MODERATE 35: CPT | Performed by: SPECIALIST

## 2024-05-12 PROCEDURE — 250N000013 HC RX MED GY IP 250 OP 250 PS 637: Performed by: ORTHOPAEDIC SURGERY

## 2024-05-12 PROCEDURE — 120N000004 HC R&B MS OVERFLOW

## 2024-05-12 PROCEDURE — 250N000011 HC RX IP 250 OP 636: Mod: JZ | Performed by: SPECIALIST

## 2024-05-12 RX ORDER — LIDOCAINE 40 MG/G
CREAM TOPICAL
Status: DISCONTINUED | OUTPATIENT
Start: 2024-05-12 | End: 2024-05-13 | Stop reason: HOSPADM

## 2024-05-12 RX ORDER — AMOXICILLIN 250 MG
1 CAPSULE ORAL 2 TIMES DAILY PRN
Qty: 30 TABLET | Refills: 0 | Status: SHIPPED | OUTPATIENT
Start: 2024-05-12

## 2024-05-12 RX ORDER — ASPIRIN 81 MG/1
81 TABLET ORAL 2 TIMES DAILY
Qty: 30 TABLET | Refills: 0 | Status: SHIPPED | OUTPATIENT
Start: 2024-05-12

## 2024-05-12 RX ADMIN — ACETAMINOPHEN 975 MG: 325 TABLET, FILM COATED ORAL at 22:07

## 2024-05-12 RX ADMIN — ASPIRIN 81 MG: 81 TABLET, COATED ORAL at 20:03

## 2024-05-12 RX ADMIN — HYDROCHLOROTHIAZIDE 25 MG: 25 TABLET ORAL at 08:12

## 2024-05-12 RX ADMIN — ASPIRIN 81 MG: 81 TABLET, COATED ORAL at 08:12

## 2024-05-12 RX ADMIN — CEFAZOLIN SODIUM 2 G: 2 INJECTION, SOLUTION INTRAVENOUS at 06:41

## 2024-05-12 RX ADMIN — LISINOPRIL 20 MG: 20 TABLET ORAL at 08:12

## 2024-05-12 RX ADMIN — DAPTOMYCIN 400 MG: 500 INJECTION, POWDER, LYOPHILIZED, FOR SOLUTION INTRAVENOUS at 10:46

## 2024-05-12 RX ADMIN — MONTELUKAST 10 MG: 10 TABLET, FILM COATED ORAL at 22:07

## 2024-05-12 RX ADMIN — AMLODIPINE BESYLATE 10 MG: 10 TABLET ORAL at 08:12

## 2024-05-12 RX ADMIN — ACETAMINOPHEN 975 MG: 325 TABLET, FILM COATED ORAL at 05:59

## 2024-05-12 RX ADMIN — ATORVASTATIN CALCIUM 20 MG: 20 TABLET, FILM COATED ORAL at 08:12

## 2024-05-12 ASSESSMENT — ACTIVITIES OF DAILY LIVING (ADL)
ADLS_ACUITY_SCORE: 23
ADLS_ACUITY_SCORE: 21
ADLS_ACUITY_SCORE: 23
ADLS_ACUITY_SCORE: 21
ADLS_ACUITY_SCORE: 23
ADLS_ACUITY_SCORE: 21

## 2024-05-12 NOTE — PROGRESS NOTES
VSS and afebile. Pt continues to rate discomfort 0-1 (0-10 scale). Left finger remains wrapped in gauze and ace wrap. Pt denies discomfort but reports slight tingling. CMS intact to left hand.

## 2024-05-12 NOTE — PROGRESS NOTES
"[facility]    Medicine Progress Note - Hospitalist Service    Date of Admission:  5/10/2024    Assessment & Plan     Ge Collado is a 63 year old male with history of hypertension, dyslipidemia, fatty liver disease, and asthma. He presented to the ED on 5/10/24 with increasing swelling and pain involving his left hand. This had come on over several days. ED evaluation showed high blood pressure but overall stable vital signs. Laboratory evaluation was unremarkable. He was admitted with flexor tenosynovitis of finger and left hand swelling. He was treated with Ancef. He was taken to the OR on 5/11/24 for washout. OR cultures were obtained. Infectious disease was consulted.        Problem list:    Infectious left index flexor sheath tenosynovitis  Postop after irrigation debridement of the flexor sheath with drainage of luly pus on 5/11/24  -Ortho and ID consults appreciated  -Cultures no growth to date  -Change Ancef to Daptomycin per ID. 2 weeks of therapy with ID follow up in 2 weeks recommended.   -Midline catheter  -Social work consult for home antibiotics  -Home once midline in and home IV antibiotics arranged; doubt today with need for midline and insurance authorization  -Pain controlled    Benign essential hypertension  -Continue PTA HCTZ and lisinopril     Asthma, without acute exacerbation  -Continue PTA inhalers and montelukast     Dyslipidemia  -Continue PTA Lipitor          Diet: Advance Diet as Tolerated: Regular Diet Adult    DVT Prophylaxis: Pneumatic Compression Devices  Alvares Catheter: Not present  Lines: None     Cardiac Monitoring: None  Code Status: Full Code      Clinically Significant Risk Factors                         # Obesity: Estimated body mass index is 31.38 kg/m  as calculated from the following:    Height as of this encounter: 1.778 m (5' 10\").    Weight as of this encounter: 99.2 kg (218 lb 11.1 oz)., PRESENT ON ADMISSION            Disposition Plan "     Medically Ready for Discharge: Anticipated Tomorrow             Jim Russo MD  Hospitalist Service  [facility]  Securely message with [employer] (more info)  Text page via [employer] Paging/Directory   ______________________________________________________________________    Interval History   No new problems. Feeling better. Less pain.     Physical Exam   Vital Signs: Temp: 97.9  F (36.6  C) Temp src: Oral BP: (!) 158/87 Pulse: 65   Resp: 20 SpO2: 96 % O2 Device: None (Room air)    Weight: 218 lbs 11.14 oz    GENERAL:  Comfortable. Cooperative.  PSYCH: pleasant, oriented, No acute distress.  EYES: PERRLA, Normal conjunctiva.  HEART:  Regular rate and rhythm. No JVD. Pulses normal. No edema.  LUNGS:  Clear to auscultation, normal Respiratory effort.  ABDOMEN:  Soft, no hepatosplenomegaly, normal bowel sounds.  EXTREMETIES: No clubbing, cyanosis or ischemia  SKIN:  Dry to touch, No rash.      Medical Decision Making       35 MINUTES SPENT BY ME on the date of service doing chart review, history, exam, documentation & further activities per the note.      Data     I have personally reviewed the following data over the past 24 hrs:    N/A  \   12.3 (L)   / N/A     N/A N/A N/A /  115 (H)   4.0 N/A N/A \       Imaging results reviewed over the past 24 hrs:   No results found for this or any previous visit (from the past 24 hour(s)).  Recent Labs   Lab 05/12/24  0758 05/11/24  0817 05/11/24  0622 05/10/24  1425   WBC  --   --   --  7.1   HGB 12.3* 13.2*  --  13.3   MCV  --   --   --  86   PLT  --   --   --  173   NA  --   --   --  138   POTASSIUM 4.0  --   --  3.5   CHLORIDE  --   --   --  103   CO2  --   --   --  23   BUN  --   --   --  17.3   CR  --   --   --  0.87   ANIONGAP  --   --   --  12   DOMINGA  --   --   --  9.6   *  --  148* 104*

## 2024-05-12 NOTE — PROGRESS NOTES
[facility]    Infectious Disease Progress Note    Date of Service : 05/12/2024       Assessment:  63YM who has been admitted with swelling and pain on his left hand over the course of several days and was found to have left index flexor tenosynovitis. S/p operative debridement on 5/10, purulence was noted in the flexor sheath and cultures are awaited.     -L index finger flexor tenosynovitis. S/p operative debridement, cxs are pending  -Chronic medical conditions - HTN, asthma, dyslipidemia     Recommendations:  Follow culture data, so far no growth  Discontinue Cefazolin  Treat with Daptomycin 4 mg/kg daily  Midline  IV antibiotics for 2 weeks with follow up visit with ID  Care integration for discharge planning  Can discharge from the ID stand point once antibiotics are arranged  Recommendations were discussed with the hospitalist service    Maria C Neves MD    Interval History   Remains stable, cxs remain negative, tolerating antibiotics without side effects      Physical Exam   Temp: 97.9  F (36.6  C) Temp src: Oral BP: (!) 158/87 Pulse: 65   Resp: 20 SpO2: 96 % O2 Device: None (Room air)    Vitals:    05/10/24 1327   Weight: 99.2 kg (218 lb 11.1 oz)     Vital Signs with Ranges  Temp:  [97.7  F (36.5  C)-97.9  F (36.6  C)] 97.9  F (36.6  C)  Pulse:  [65-86] 65  Resp:  [16-20] 20  BP: (136-158)/(70-87) 158/87  SpO2:  [96 %-97 %] 96 %    GENERAL APPEARANCE:  awake  EYES: Eyes grossly normal to inspection  RESP: lungs clear   MS:L hand in dressing  SKIN: no rash    Other:    Medications   Current Facility-Administered Medications   Medication Dose Route Frequency Provider Last Rate Last Admin    lactated ringers infusion   Intravenous Continuous Naye Pereyra MD 75 mL/hr at 05/11/24 1350 New Bag at 05/11/24 1350     Current Facility-Administered Medications   Medication Dose Route Frequency Provider Last Rate Last Admin    acetaminophen (TYLENOL) tablet 975 mg  975 mg Oral Q8H  Naye Pereyra MD   975 mg at 05/12/24 0559    amLODIPine (NORVASC) tablet 10 mg  10 mg Oral Daily Hilario Torres MD   10 mg at 05/12/24 0812    aspirin EC tablet 81 mg  81 mg Oral BID Naye Pereyra MD   81 mg at 05/12/24 0812    atorvastatin (LIPITOR) tablet 20 mg  20 mg Oral Daily Hilario Torres MD   20 mg at 05/12/24 0812    ceFAZolin (ANCEF) 2 g in 100 mL D5W intermittent infusion  2 g Intravenous Q8H Hilario Torres  mL/hr at 05/12/24 0641 2 g at 05/12/24 0641    fluticasone-vilanterol (BREO ELLIPTA) 100-25 MCG/ACT inhaler 1 puff  1 puff Inhalation Daily Hilario Torres MD        hydrochlorothiazide (HYDRODIURIL) tablet 25 mg  25 mg Oral Daily Hilario Torres MD   25 mg at 05/12/24 0812    lisinopril (ZESTRIL) tablet 20 mg  20 mg Oral Daily Hilario Torres MD   20 mg at 05/12/24 0812    montelukast (SINGULAIR) tablet 10 mg  10 mg Oral At Bedtime Hilario Torres MD   10 mg at 05/11/24 2155    polyethylene glycol (MIRALAX) Packet 17 g  17 g Oral Daily Naye Pereyra MD        senna-docusate (SENOKOT-S/PERICOLACE) 8.6-50 MG per tablet 1 tablet  1 tablet Oral BID Naye Pereyra MD        sodium chloride (PF) 0.9% PF flush 3 mL  3 mL Intracatheter Q8H Naye Pereyra MD        sodium chloride (PF) 0.9% PF flush 3 mL  3 mL Intracatheter Q8H Hilario Torres MD           Data   All microbiology laboratory data reviewed.  Recent Labs   Lab Test 05/12/24  0758 05/11/24  0817 05/10/24  1425   WBC  --   --  7.1   HGB 12.3* 13.2* 13.3   HCT  --   --  37.0*   MCV  --   --  86   PLT  --   --  173     Recent Labs   Lab Test 05/10/24  1425   CR 0.87     05/10/2024 2130 05/12/2024 0032 Anaerobic Bacterial Culture Routine [[id number]]   Swab from Hand, Left    Preliminary result Component Value   Culture No anaerobic organisms isolated after 1 day P             05/10/2024 2130 05/11/2024 0106 Gram Stain  [[id number]]   Swab from Hand, Left    Final result Component Value   Gram Stain Result No organisms seen   Gram Stain Result 3+ WBC seen   Predominantly PMNs          05/10/2024 2130 05/12/2024 0732 Swab Aerobic Bacterial Culture Routine [[id number]]   Swab from Hand, Left    Preliminary result Component Value   Culture No growth after 1 day P

## 2024-05-12 NOTE — PLAN OF CARE
"Goal Outcome Evaluation:      Plan of Care Reviewed With: patient    Overall Patient Progress: improving    Orientation: Alert and oriented x4  VSS. 97% on RA. afebrile.   LS: clear and equal bilaterally.   GI: Passing gas. no BM. Denies N/V.   : Adequate urine output.   Skin: surgical incision with drain to left index finger. KAREN d/t dressing in place. Dressing C/D/I.   Activity: Independent. Pt slept comfortably throughout shift.   Pain: 2/10 pain in left index finger. Well controlled with scheduled tylenol.   Updates/Plan: Continue with current cares.           Problem: Adult Inpatient Plan of Care  Goal: Plan of Care Review  Description: The Plan of Care Review/Shift note should be completed every shift.  The Outcome Evaluation is a brief statement about your assessment that the patient is improving, declining, or no change.  This information will be displayed automatically on your shift  note.  Outcome: Progressing  Flowsheets (Taken 5/12/2024 0612)  Plan of Care Reviewed With: patient  Overall Patient Progress: improving  Goal: Patient-Specific Goal (Individualized)  Description: You can add care plan individualizations to a care plan. Examples of Individualization might be:  \"Parent requests to be called daily at 9am for status\", \"I have a hard time hearing out of my right ear\", or \"Do not touch me to wake me up as it startles  me\".  Outcome: Progressing  Goal: Absence of Hospital-Acquired Illness or Injury  Outcome: Progressing  Intervention: Identify and Manage Fall Risk  Recent Flowsheet Documentation  Taken 5/11/2024 7436 by Liz Claros RN  Safety Promotion/Fall Prevention:   nonskid shoes/slippers when out of bed   patient and family education   room near nurse's station   room organization consistent   safety round/check completed   treat reversible contributory factors   treat underlying cause  Intervention: Prevent Skin Injury  Recent Flowsheet Documentation  Taken 5/12/2024 0600 " by Liz Claros RN  Body Position: position changed independently  Skin Protection: adhesive use limited  Device Skin Pressure Protection:   adhesive use limited   tubing/devices free from skin contact  Taken 5/11/2024 2356 by Liz Claros RN  Body Position: position changed independently  Goal: Optimal Comfort and Wellbeing  Outcome: Progressing  Intervention: Monitor Pain and Promote Comfort  Recent Flowsheet Documentation  Taken 5/12/2024 0600 by Liz Claros RN  Pain Management Interventions: medication (see MAR)  Goal: Readiness for Transition of Care  Outcome: Progressing  Intervention: Mutually Develop Transition Plan  Recent Flowsheet Documentation  Taken 5/11/2024 2351 by Liz Claros RN  Equipment Currently Used at Home: none     Problem: Infection  Goal: Absence of Infection Signs and Symptoms  Outcome: Progressing  Intervention: Prevent or Manage Infection  Recent Flowsheet Documentation  Taken 5/11/2024 2356 by Liz Claros RN  Infection Management: aseptic technique maintained     Problem: Skin Injury Risk Increased  Goal: Skin Health and Integrity  Outcome: Progressing  Intervention: Optimize Skin Protection  Recent Flowsheet Documentation  Taken 5/12/2024 0600 by Liz Claros RN  Skin Protection: adhesive use limited  Activity Management: activity adjusted per tolerance  Head of Bed (HOB) Positioning: HOB at 15 degrees  Taken 5/11/2024 2356 by Liz Claros RN  Activity Management: activity adjusted per tolerance  Head of Bed (HOB) Positioning: HOB at 15 degrees

## 2024-05-12 NOTE — PLAN OF CARE
Goal Outcome Evaluation:      Plan of Care Reviewed With: patient    Overall Patient Progress: improvingOverall Patient Progress: improving       VSS. Afebrile.   Denies pain.   Hand redressed by Ortho. Drain removed. Good cap refill. Denies numbness or tingling.  Eating and drinking well.   Ambulating polk. Sitting up in chair.       Problem: Adult Inpatient Plan of Care  Goal: Plan of Care Review  Description: The Plan of Care Review/Shift note should be completed every shift.  The Outcome Evaluation is a brief statement about your assessment that the patient is improving, declining, or no change.  This information will be displayed automatically on your shift  note.  Outcome: Progressing  Flowsheets (Taken 5/12/2024 1816)  Plan of Care Reviewed With: patient  Overall Patient Progress: improving  Goal: Absence of Hospital-Acquired Illness or Injury  Intervention: Identify and Manage Fall Risk  Recent Flowsheet Documentation  Taken 5/12/2024 0800 by Yola Lopez RN  Safety Promotion/Fall Prevention:   nonskid shoes/slippers when out of bed   patient and family education   room near nurse's station   room organization consistent   safety round/check completed   treat reversible contributory factors   treat underlying cause  Intervention: Prevent Skin Injury  Recent Flowsheet Documentation  Taken 5/12/2024 0800 by Yola Lopez RN  Body Position: position changed independently     Problem: Skin Injury Risk Increased  Goal: Skin Health and Integrity  Intervention: Optimize Skin Protection  Recent Flowsheet Documentation  Taken 5/12/2024 0800 by Yola Lopez RN  Activity Management: activity adjusted per tolerance  Head of Bed (HOB) Positioning: HOB at 15 degrees

## 2024-05-12 NOTE — PROGRESS NOTES
"Ge RENTERIA Jamiefrankdwaine  2024  POD # 2 s/p I&D left index finger  Admit Date:  5/10/2024      Doing well.  No immediate surgical complications identified.  No excessive bleeding  Pain well-controlled.  Objective:  Blood pressure (!) 158/87, pulse 65, temperature 97.9  F (36.6  C), temperature source Oral, resp. rate 20, height 1.778 m (5' 10\"), weight 99.2 kg (218 lb 11.1 oz), SpO2 96%.    Temperatures:  Current - Temp: 97.6  F (36.4  C); Max - Temp  Av.8  F (36.6  C)  Min: 96.9  F (36.1  C)  Max: 98.5  F (36.9  C)  Pulse range: Pulse  Av.2  Min: 79  Max: 95  Blood pressure range: Systolic (24hrs), Av , Min:142 , Max:182   ; Diastolic (24hrs), Av, Min:70, Max:98    Exam:  CMS: intact  alert, stable, wound ok  Communicates clearly with examiner  Pain improved  Brisk capillary refill all digits LUE  Incision healing nicely. Sutures in place.     Labs:  Recent Labs   Lab Test 05/10/24  1425   POTASSIUM 3.5     Recent Labs   Lab Test 24  0817 05/10/24  1425   HGB 13.2* 13.3     No results for input(s): \"INR\" in the last 69569 hours.  Recent Labs   Lab Test 05/10/24  1425          PLAN: continue abx. Patient waiting for PICC line before discharge. Waiting final results from surgery. Continue pain control. Anticipate discharge to home tomorrow. Drain removed, and new dressing with adaptic , klingon, and coban was placed. Patient is allowed to start gentle range of motion at the left index finger.     "

## 2024-05-13 ENCOUNTER — HOME INFUSION (PRE-WILLOW HOME INFUSION) (OUTPATIENT)
Dept: PHARMACY | Facility: CLINIC | Age: 64
End: 2024-05-13
Payer: COMMERCIAL

## 2024-05-13 VITALS
TEMPERATURE: 97.6 F | BODY MASS INDEX: 31.31 KG/M2 | HEART RATE: 70 BPM | RESPIRATION RATE: 16 BRPM | HEIGHT: 70 IN | SYSTOLIC BLOOD PRESSURE: 161 MMHG | DIASTOLIC BLOOD PRESSURE: 79 MMHG | WEIGHT: 218.7 LBS | OXYGEN SATURATION: 97 %

## 2024-05-13 LAB
BACTERIA SPEC CULT: NO GROWTH
POTASSIUM SERPL-SCNC: 4 MMOL/L (ref 3.4–5.3)

## 2024-05-13 PROCEDURE — 250N000011 HC RX IP 250 OP 636: Mod: JZ | Performed by: SPECIALIST

## 2024-05-13 PROCEDURE — 99239 HOSP IP/OBS DSCHRG MGMT >30: CPT | Performed by: INTERNAL MEDICINE

## 2024-05-13 PROCEDURE — 36569 INSJ PICC 5 YR+ W/O IMAGING: CPT

## 2024-05-13 PROCEDURE — 258N000003 HC RX IP 258 OP 636: Performed by: SPECIALIST

## 2024-05-13 PROCEDURE — 36415 COLL VENOUS BLD VENIPUNCTURE: CPT | Performed by: INTERNAL MEDICINE

## 2024-05-13 PROCEDURE — 272N000748 HC KIT, CATH 3FR OR 4FR SINGLE LUMEN POWERMIDLINE

## 2024-05-13 PROCEDURE — 84132 ASSAY OF SERUM POTASSIUM: CPT | Performed by: INTERNAL MEDICINE

## 2024-05-13 PROCEDURE — 250N000013 HC RX MED GY IP 250 OP 250 PS 637: Performed by: INTERNAL MEDICINE

## 2024-05-13 PROCEDURE — 99232 SBSQ HOSP IP/OBS MODERATE 35: CPT | Performed by: INTERNAL MEDICINE

## 2024-05-13 PROCEDURE — 250N000013 HC RX MED GY IP 250 OP 250 PS 637: Performed by: ORTHOPAEDIC SURGERY

## 2024-05-13 RX ORDER — ACETAMINOPHEN 325 MG/1
975 TABLET ORAL EVERY 8 HOURS PRN
Qty: 60 TABLET | Refills: 0 | Status: SHIPPED | OUTPATIENT
Start: 2024-05-13

## 2024-05-13 RX ORDER — POLYETHYLENE GLYCOL 3350 17 G/17G
17 POWDER, FOR SOLUTION ORAL DAILY
Qty: 510 G | Refills: 0 | Status: SHIPPED | OUTPATIENT
Start: 2024-05-13

## 2024-05-13 RX ORDER — IBUPROFEN 600 MG/1
600 TABLET, FILM COATED ORAL EVERY 6 HOURS PRN
Qty: 60 TABLET | Refills: 0 | Status: SHIPPED | OUTPATIENT
Start: 2024-05-13

## 2024-05-13 RX ADMIN — ASPIRIN 81 MG: 81 TABLET, COATED ORAL at 08:14

## 2024-05-13 RX ADMIN — DAPTOMYCIN 400 MG: 500 INJECTION, POWDER, LYOPHILIZED, FOR SOLUTION INTRAVENOUS at 10:19

## 2024-05-13 RX ADMIN — HYDROCHLOROTHIAZIDE 25 MG: 25 TABLET ORAL at 08:15

## 2024-05-13 RX ADMIN — ACETAMINOPHEN 975 MG: 325 TABLET, FILM COATED ORAL at 06:43

## 2024-05-13 RX ADMIN — ACETAMINOPHEN 975 MG: 325 TABLET, FILM COATED ORAL at 14:07

## 2024-05-13 RX ADMIN — ATORVASTATIN CALCIUM 20 MG: 20 TABLET, FILM COATED ORAL at 08:14

## 2024-05-13 RX ADMIN — LISINOPRIL 20 MG: 20 TABLET ORAL at 08:14

## 2024-05-13 RX ADMIN — AMLODIPINE BESYLATE 10 MG: 10 TABLET ORAL at 08:15

## 2024-05-13 ASSESSMENT — ACTIVITIES OF DAILY LIVING (ADL)
ADLS_ACUITY_SCORE: 21
DEPENDENT_IADLS:: INDEPENDENT
ADLS_ACUITY_SCORE: 21

## 2024-05-13 NOTE — DISCHARGE SUMMARY
"[facility]  Hospitalist Discharge Summary      Date of Admission:  5/10/2024  Date of Discharge:  5/13/2024  Discharging Provider: Jim Russo MD  Discharge Service: Hospitalist Service    Discharge Diagnoses     Infectious left index flexor sheath tenosynovitis  Postop after irrigation debridement of the flexor sheath with drainage of luly pus on 5/11/24  Benign essential hypertension  Asthma, without acute exacerbation  Dyslipidemia    Clinically Significant Risk Factors     # Obesity: Estimated body mass index is 31.38 kg/m  as calculated from the following:    Height as of this encounter: 1.778 m (5' 10\").    Weight as of this encounter: 99.2 kg (218 lb 11.1 oz).       Follow-ups Needed After Discharge   Follow-up Appointments     Follow-up and recommended labs and tests       Follow-up with Dr. Pereyra/Shahrzad Foreman PA-C ([facility]) at 7 days postop for reevaluation. No need for follow up labs   or testing prior to this appointment.     Please contact Dr. Pereyra's care coordinator, Jaxson, at [phone] to   schedule or for any questions related to your orthopedic injury/surgery.    [facility] [place] After Hours Phone: [phone]        Follow-up and recommended labs and tests       Follow up with Dr. Maria C Neves at [facility] in 2 weeks   ([phone])            Unresulted Labs Ordered in the Past 30 Days of this Admission       Date and Time Order Name Status Description    5/10/2024  9:39 PM Anaerobic Bacterial Culture Routine Preliminary         These results will be followed up by hospitalists    Discharge Disposition   Discharged to home  Condition at discharge: Stable    Hospital Course   Ge Collado is a 63 year old male with history of hypertension, dyslipidemia, fatty liver disease, and asthma. He presented to the ED on 5/10/24 with increasing swelling and pain involving his left hand. This had come on over " several days. ED evaluation showed high blood pressure but overall stable vital signs. Laboratory evaluation was unremarkable. He was admitted with flexor tenosynovitis of finger and left hand swelling. He was treated with Ancef. He was taken to the OR on 5/11/24 for washout. OR cultures were obtained. Infectious disease was consulted.        Problem list:    Infectious left index flexor sheath tenosynovitis  Postop after irrigation debridement of the flexor sheath with drainage of luly pus on 5/11/24  -Ortho and ID consults appreciated  -Cultures no growth to date  -Discharge home on Daptomycin per ID. 2 weeks of therapy with ID follow up in 2 weeks recommended.   -Midline catheter placed  -Social work consulted for home infusion consult for home antibiotics    Benign essential hypertension  -Continue PTA HCTZ and lisinopril     Asthma, without acute exacerbation  -Continue PTA inhalers and montelukast     Dyslipidemia  -Continue PTA Lipitor    Consultations This Hospital Stay   ORTHOPEDIC SURGERY IP CONSULT  INFECTIOUS DISEASES IP CONSULT  VASCULAR ACCESS ADULT IP CONSULT  VASCULAR ACCESS ADULT IP CONSULT  SOCIAL WORK IP CONSULT  CARE MANAGEMENT / SOCIAL WORK IP CONSULT    Code Status   Full Code    Time Spent on this Encounter   I, Jim Russo MD, personally saw the patient today and spent greater than 30 minutes discharging this patient.       Jim Russo MD  [facility]  [address] [place] [address]  Phone: [phone]  Fax: [phone]  ______________________________________________________________________    Physical Exam   Vital Signs: Temp: 97.6  F (36.4  C) Temp src: Oral BP: (!) 161/79 Pulse: 70   Resp: 18 SpO2: 97 % O2 Device: None (Room air)    Weight: 218 lbs 11.14 oz  GENERAL:  Comfortable. Cooperative.  PSYCH: pleasant, oriented, No acute distress.  EYES: PERRLA, Normal conjunctiva.  HEART:  Regular rate and rhythm. No JVD. Pulses normal. No  edema.  LUNGS:  Clear to auscultation, normal Respiratory effort.  ABDOMEN:  Soft, no hepatosplenomegaly, normal bowel sounds.  EXTREMETIES: No clubbing, cyanosis or ischemia  SKIN:  Dry to touch, No rash.         Primary Care Physician   [facility]    Discharge Orders      Home Care Referral      Home Infusion Referral      Reason for your hospital stay    S/p left index finger I&D (DOS: 5/10/24) with Dr. Nadiya Pereyra     Follow-up and recommended labs and tests     Follow-up with Dr. Pereyra/Shahrzad Foreman PA-C ([facility]) at 7 days postop for reevaluation. No need for follow up labs or testing prior to this appointment.     Please contact Dr. Pereyra's care coordinator, Jaxson, at [phone] to schedule or for any questions related to your orthopedic injury/surgery.    [facility] [place] After Hours Phone: [phone]     Activity    Your activity upon discharge: No gripping, grasping, or lifting involving the left index finger. Encourage frequent left index finger (and other finger/thumb and wrist ROM) to prevent stiffness. Will need hand therapy outpatient.     Wound care and dressings    Instructions to care for your wound at home: Left hand dressing to remain clean, dry, and intact until postop visit. Please cover securely while showering. If dressing begins to fall off, okay for patient to reinforce. Please contact Dr. Pereyra's team with any surgical dressing/site questions or concerns.     Discharge Instructions    Pain after surgery is normal and expected.  You will have some amount of pain and swelling for several weeks after surgery.  These symptoms will improve with time.  There are several things you can do to help reduce your pain including: rest, cold compresses, elevation, and using pain medications as needed. Contact your Surgeon Team if you have pain that persists or worsens after surgery despite rest, ice, elevation, and taking your medication(s) as  prescribed. Contact your Surgeon Team if you have new numbness, tingling, or weakness in your operative extremity.    Swelling and/or bruising of the surgical extremity is common and may persist for several months after surgery. In addition to frequent icing and elevation, gentle compressive support with an ACE wrap or tubigrip may help with swelling. Apply compression regularly, removing at least twice daily to perform skin checks. Contact your Surgeon Team if your swelling increases and is NOT associated with an increase in your activity level, or if your swelling increases and is associated with redness and pain.    Ice can be used to control swelling and discomfort after surgery. Place a thin towel over your operative site and apply the ice pack overtop. Leave ice pack in place for 20 minutes, then remove for 20 minutes. Repeat this 20 minutes on/20 minutes off routine as often as tolerated.    Please contact your surgical team with any concerns for infection including increasing redness around your surgical site, pus-like drainage, fever, chills, or flu-like symptoms.     Reason for your hospital stay    Infected index finger     Follow-up and recommended labs and tests     Follow up with Dr. Maria C Neves at [facility] in 2 weeks ([phone])     Activity    Your activity upon discharge: activity as tolerated. No heavy lifting with arm that has midline catheter until removed     Diet    Follow this diet upon discharge: Regular       Significant Results and Procedures   Most Recent 3 CBC's:  Recent Labs   Lab Test 05/12/24  0758 05/11/24  0817 05/10/24  1425   WBC  --   --  7.1   HGB 12.3* 13.2* 13.3   MCV  --   --  86   PLT  --   --  173     Most Recent 3 BMP's:  Recent Labs   Lab Test 05/13/24  0829 05/12/24  0758 05/11/24  0622 05/10/24  1425   NA  --   --   --  138   POTASSIUM 4.0 4.0  --  3.5   CHLORIDE  --   --   --  103   CO2  --   --   --  23   BUN  --   --   --  17.3   CR  --   --   --   0.87   ANIONGAP  --   --   --  12   DOMINGA  --   --   --  9.6   GLC  --  115* 148* 104*     Most Recent 2 LFT's:No lab results found.  7-Day Micro Results       Collected Updated Procedure Result Status      05/10/2024 2130 05/13/2024 0031 Anaerobic Bacterial Culture Routine [[id number]]   Swab from Hand, Left    Preliminary result Component Value   Culture No anaerobic organisms isolated after 2 days  [P]                05/10/2024 2130 05/11/2024 0106 Gram Stain [[id number]]   Swab from Hand, Left    Final result Component Value   Gram Stain Result No organisms seen   Gram Stain Result 3+ WBC seen   Predominantly PMNs            05/10/2024 2130 05/13/2024 1027 Swab Aerobic Bacterial Culture Routine [[id number]]   Swab from Hand, Left    Final result Component Value   Culture No Growth                   ,   Results for orders placed or performed in visit on 05/14/08   CT SCAN FACE, JAW    Impression       CT MAXILLOFACIAL WITHOUT CONTRAST  5/14/2008  3:02 PM     HISTORY: Sinusitis. Headaches.     TECHNIQUE: Axial images were obtained through the paranasal sinuses.  Coronal reconstructions were also acquired.     FINDINGS: Mild rightward nasal septal deviation is present. There is  some minimal soft tissue density abutting the middle upper portion of  the nasal septum bilaterally which could be due to small polyps or  just some mucous membrane thickening. Both ostiomeatal units and  sphenoethmoidal recess regions are patent. There are retention cysts  in the floor of both maxillary sinuses. There is opacification of a  left posterior ethmoid air cell. The ethmoid air cells are otherwise  clear. The frontal sinuses and sphenoid sinuses are clear.     IMPRESSION:  1. Small amount of abnormal soft tissue density and/or mucous membrane  thickening abutting the upper middle nasal septum. These could be due  to small polyps or just some mucous membrane thickening.  2. Bilateral retention cyst in maxilla sinuses.  3.  Opacification of a left posterior ethmoid air cell.  4. No evidence for any air-fluid levels.       Discharge Medications   Current Discharge Medication List        START taking these medications    Details   acetaminophen (TYLENOL) 325 MG tablet Take 3 tablets (975 mg) by mouth every 8 hours as needed for mild pain  Qty: 60 tablet, Refills: 0    Associated Diagnoses: Flexor tenosynovitis of finger      aspirin 81 MG EC tablet Take 1 tablet (81 mg) by mouth 2 times daily  Qty: 30 tablet, Refills: 0    Associated Diagnoses: Flexor tenosynovitis of finger      DAPTOmycin 400 mg Inject 400 mg into the vein every 24 hours for 14 doses    Comments: Check weekly CBC/diff, Ck, creatinine,  Send results to intermed consultants Dr Neves  Associated Diagnoses: Flexor tenosynovitis of finger      ibuprofen (ADVIL/MOTRIN) 600 MG tablet Take 1 tablet (600 mg) by mouth every 6 hours as needed for other (inflammatory pain)  Qty: 60 tablet, Refills: 0    Associated Diagnoses: Flexor tenosynovitis of finger      oxyCODONE (ROXICODONE) 5 MG tablet Take 1-2 tablets (5-10 mg) by mouth every 4 hours as needed for moderate pain  Qty: 15 tablet, Refills: 0    Associated Diagnoses: Flexor tenosynovitis of finger      polyethylene glycol (MIRALAX) 17 GM/Dose powder Take 17 g by mouth daily  Qty: 510 g, Refills: 0    Associated Diagnoses: Flexor tenosynovitis of finger      senna-docusate (SENOKOT-S/PERICOLACE) 8.6-50 MG tablet Take 1 tablet by mouth 2 times daily as needed for constipation  Qty: 30 tablet, Refills: 0    Associated Diagnoses: Flexor tenosynovitis of finger           CONTINUE these medications which have NOT CHANGED    Details   albuterol (PROAIR HFA/PROVENTIL HFA/VENTOLIN HFA) 108 (90 Base) MCG/ACT inhaler Inhale 2 puffs into the lungs every 4 hours as needed for shortness of breath, wheezing or cough      amLODIPine (NORVASC) 10 MG tablet Take 10 mg by mouth daily      atorvastatin (LIPITOR) 20 MG tablet Take 20 mg by mouth  daily      fluticasone-salmeterol (ADVAIR) 250-50 MCG/ACT inhaler Inhale 1 puff into the lungs 2 times daily      hydrochlorothiazide (HYDRODIURIL) 25 MG tablet Take 25 mg by mouth daily      lisinopril (ZESTRIL) 20 MG tablet Take 20 mg by mouth daily      montelukast (SINGULAIR) 10 MG tablet Take 10 mg by mouth at bedtime           STOP taking these medications       sulfamethoxazole-trimethoprim (BACTRIM DS) 800-160 MG tablet Comments:   Reason for Stopping:             Allergies   No Known Allergies

## 2024-05-13 NOTE — CONSULTS
Care Management Initial Consult    General Information  Assessment completed with: Patient,    Type of CM/SW Visit: Initial Assessment    Primary Care Provider verified and updated as needed: Yes   Readmission within the last 30 days:        Reason for Consult: discharge planning         Communication Assessment  Patient's communication style: spoken language (English or Bilingual)    Hearing Difficulty or Deaf: yes   Wear Glasses or Blind: no    Cognitive  Cognitive/Neuro/Behavioral: WDL                      Living Environment:   People in home: spouse, child(dorothy), adult     Current living Arrangements: house      Able to return to prior arrangements: yes       Family/Social Support:  Care provided by: self  Provides care for: no one  Marital Status:   Wife          Description of Support System: Supportive, Involved    Support Assessment: Adequate family and caregiver support    Current Resources:   Patient receiving home care services: No     Community Resources: Home Infusion  Equipment currently used at home: none  Supplies currently used at home: None    Does the patient's insurance plan have a 3 day qualifying hospital stay waiver?  No    Lifestyle & Psychosocial Needs:  Social Determinants of Health     Food Insecurity: No Food Insecurity (2/12/2024)    Received from [employer] [facility] [employer]    Food Insecurity     Worried About Running Out of Food in the Last Year: 1   Depression: Not at risk (12/20/2023)    Received from [employer] [facility] [employer]    PHQ-2     PHQ-2 TOTAL SCORE: 0   Housing Stability: Low Risk  (2/12/2024)    Received from [employer] [facility] [employer]    Housing Stability     Unable to Pay for Housing in the Last Year: 1   Tobacco Use: Low Risk  (5/10/2024)    Received from [employer] [facility] [employer]    Patient History     Smoking Tobacco Use: Never     Smokeless Tobacco Use: Never     Passive Exposure: Not on  file   Financial Resource Strain: Low Risk  (2/12/2024)    Received from [facility]    Financial Resource Strain     Difficulty of Paying Living Expenses: 3     Difficulty of Paying Living Expenses: Not on file   Alcohol Use: Not on file   Transportation Needs: No Transportation Needs (2/12/2024)    Received from [facility]    Transportation Needs     Lack of Transportation (Medical): 1   Physical Activity: Not on file   Interpersonal Safety: Not on file   Stress: Not on file   Social Connections: Socially Integrated (2/12/2024)    Received from [facility]    Social Connections     Frequency of Communication with Friends and Family: 0   Health Literacy: Not on file       Functional Status:  Prior to admission patient needed assistance:   Dependent ADLs:: Independent  Dependent IADLs:: Independent     Additional Information:  CM consulted for discharge planning/arrange home infusion services. Met with pt at bedside. Lives with spouse in a single family home. Step-son and his wife live in the lower level of the home. Step D-I-L is a nurse and would be willing to assist with infusion needs. Pt is independent with ADLs and IADLs at baseline.  Family will provide transportation home.  Referral sent to [facility] for a benefit check.  Will continue to follow for discharge planning.    ADDENDUM: Per [facility]-Pt has coverage for iv abx through their [employer] plan. Pt has a deductible of $3200 (met $497.41). He has an 80/20 coverage. OOP is $5700 (met $485.34). Once OOP has been met pt should he covered at 100%.     Pt is aware and would like to proceed with services through [facility]. [facility] Liaison will complete a bedside teach once line is placed.    Care Management Discharge Note    Discharge Date: 05/13/2024       Discharge Disposition: Home Infusion    Discharge Services:      Discharge DME: None    Discharge Transportation: family or friend  will provide    Private pay costs discussed: Not applicable    Does the patient's insurance plan have a 3 day qualifying hospital stay waiver?  No    Education Provided on the Discharge Plan:  Yes  Persons Notified of Discharge Plans: Bedside nurse, FHI, provider, pt  Patient/Family in Agreement with the Plan: yes    Handoff Referral Completed: No    Additional Information:  Pt will be discharging home today with services through [facility] for IV antibiotics.  I liaison has completed the bedside teaching.  Spouse will be providing transportation home.  Please call if additional needs arise.    Danielle Chaudhari RN   Inpatient Care Coordination  [facility]   Phone: [phone]

## 2024-05-13 NOTE — PROCEDURES
[facility]    Single Lumen Midline Placement    Date/Time: 5/13/2024 1:30 PM    Performed by: Laureano Castrejon RN  Authorized by: Bobby Washington MD  Indications: vascular access      UNIVERSAL PROTOCOL   Site Marked: Yes  Prior Images Obtained and Reviewed:  Yes  Required items: Required blood products, implants, devices and special equipment available    Patient identity confirmed:  Verbally with patient, arm band and hospital-assigned identification number  NA - No sedation, light sedation, or local anesthesia  Confirmation Checklist:  Patient's identity using two indicators, relevant allergies, procedure was appropriate and matched the consent or emergent situation and correct equipment/implants were available  Time out: Immediately prior to the procedure a time out was called    Universal Protocol: the Joint Commission Universal Protocol was followed    Preparation: Patient was prepped and draped in usual sterile fashion       ANESTHESIA    Anesthesia:  Local infiltration  Local Anesthetic:  Lidocaine 1% without epinephrine  Anesthetic Total (mL):  1      SEDATION    Patient Sedated: No        Preparation: skin prepped with ChloraPrep  Skin prep agent: skin prep agent completely dried prior to procedure  Sterile barriers: maximum sterile barriers were used: cap, mask, sterile gown, sterile gloves, and large sterile sheet  Hand hygiene: hand hygiene performed prior to central venous catheter insertion  Type of line used: Midline  Catheter type: single lumen  Lumen type: non-valved  Catheter size: 3 Fr  Brand: Bard  Lot number: [id number]  Placement method: venipuncture, MST and ultrasound  Number of attempts: 1  Difficulty threading catheter: no  Successful placement: yes  Orientation: right  Catheter to Vein (%): 8  Location: cephalic vein  Tip Location: distal to axillary vein  Arm circumference: adults 10 cm  Extremity circumference: 33  Visible catheter length: 2  Internal length: 13  cm  Total catheter length: 15  Dressing and securement: alcohol impregnated caps, blood cleaned with CHG, chlorhexidine disc applied, blood removed, site cleansed, sterile dressing applied and subcutaneous anchor securement system  Post procedure assessment: blood return through all ports and free fluid flow  PROCEDURE   Patient Tolerance:  Patient tolerated the procedure well with no immediate complicationsDescribe Procedure: Midline ok to use

## 2024-05-13 NOTE — PROGRESS NOTES
[facility]    Infectious Disease Progress Note    Date of Service : 05/13/2024       Assessment:  63YM who has been admitted with swelling and pain on his left hand over the course of several days and was found to have left index flexor tenosynovitis. S/p operative debridement on 5/10, purulence was noted in the flexor sheath and cultures are awaited.     -L index finger flexor tenosynovitis. S/p operative debridement, cxs are pending  -Chronic medical conditions - HTN, asthma, dyslipidemia     Recommendations:  Follow culture data, finalized as no growth  Okay disposition  Treat with Daptomycin 4 mg/kg daily  Midline ok  IV antibiotics for 2 weeks with follow up visit with ID  Care integration for discharge planning  Can discharge from the ID stand point once antibiotics are arranged      Bobby Washington MD    Interval History   Remains stable, cxs remain negative, tolerating antibiotics without side effects      Physical Exam   Temp: 97.6  F (36.4  C) Temp src: Oral BP: (!) 161/79 Pulse: 70   Resp: 18 SpO2: 97 % O2 Device: None (Room air)    Vitals:    05/10/24 1327   Weight: 99.2 kg (218 lb 11.1 oz)     Vital Signs with Ranges  Temp:  [97.6  F (36.4  C)-98.4  F (36.9  C)] 97.6  F (36.4  C)  Pulse:  [70-98] 70  Resp:  [18-20] 18  BP: (142-161)/(74-79) 161/79  SpO2:  [96 %-97 %] 97 %    GENERAL APPEARANCE:  awake  EYES: Eyes grossly normal to inspection  RESP: lungs clear   MS:L hand in dressing  SKIN: no rash    Other:    Medications   Current Facility-Administered Medications   Medication Dose Route Frequency Provider Last Rate Last Admin    lactated ringers infusion   Intravenous Continuous Naye Pereyra MD 75 mL/hr at 05/11/24 1350 New Bag at 05/11/24 1350     Current Facility-Administered Medications   Medication Dose Route Frequency Provider Last Rate Last Admin    acetaminophen (TYLENOL) tablet 975 mg  975 mg Oral Q8H Naye Pereyra MD   975 mg at 05/13/24  0643    amLODIPine (NORVASC) tablet 10 mg  10 mg Oral Daily Hilario Torres MD   10 mg at 05/13/24 0815    aspirin EC tablet 81 mg  81 mg Oral BID Naye Pereyra MD   81 mg at 05/13/24 0814    atorvastatin (LIPITOR) tablet 20 mg  20 mg Oral Daily Hilario Torres MD   20 mg at 05/13/24 0814    DAPTOmycin (CUBICIN) 400 mg in sodium chloride 0.9 % 100 mL intermittent infusion  4 mg/kg Intravenous Q24H Maria C Neves MD   400 mg at 05/13/24 1019    fluticasone-vilanterol (BREO ELLIPTA) 100-25 MCG/ACT inhaler 1 puff  1 puff Inhalation Daily Hilario Torres MD        hydrochlorothiazide (HYDRODIURIL) tablet 25 mg  25 mg Oral Daily Hilario Torres MD   25 mg at 05/13/24 0815    lisinopril (ZESTRIL) tablet 20 mg  20 mg Oral Daily Hilario Torres MD   20 mg at 05/13/24 0814    montelukast (SINGULAIR) tablet 10 mg  10 mg Oral At Bedtime Hilario Torres MD   10 mg at 05/12/24 2207    polyethylene glycol (MIRALAX) Packet 17 g  17 g Oral Daily Naye Pereyra MD        senna-docusate (SENOKOT-S/PERICOLACE) 8.6-50 MG per tablet 1 tablet  1 tablet Oral BID Naye Pereyra MD        sodium chloride (PF) 0.9% PF flush 10 mL  10 mL Intracatheter Q8H Jim Russo MD   3 mL at 05/12/24 2004    sodium chloride (PF) 0.9% PF flush 3 mL  3 mL Intracatheter Q8H Naye Pereyra MD   3 mL at 05/13/24 0817    sodium chloride (PF) 0.9% PF flush 3 mL  3 mL Intracatheter Q8H Hilario Torres MD   3 mL at 05/13/24 0643       Data   All microbiology laboratory data reviewed.  Recent Labs   Lab Test 05/12/24  0758 05/11/24  0817 05/10/24  1425   WBC  --   --  7.1   HGB 12.3* 13.2* 13.3   HCT  --   --  37.0*   MCV  --   --  86   PLT  --   --  173     Recent Labs   Lab Test 05/10/24  1425   CR 0.87     05/10/2024 2130 05/12/2024 0032 Anaerobic Bacterial Culture Routine [[id number]]   Swab from Hand, Left    Preliminary result Component Value   Culture No  anaerobic organisms isolated after 1 day P             05/10/2024 2130 05/11/2024 0106 Gram Stain [[id number]]   Swab from Hand, Left    Final result Component Value   Gram Stain Result No organisms seen   Gram Stain Result 3+ WBC seen   Predominantly PMNs          05/10/2024 2130 05/12/2024 0732 Swab Aerobic Bacterial Culture Routine [[id number]]   Swab from Hand, Left    Preliminary result Component Value   Culture No growth after 1 day P

## 2024-05-13 NOTE — PROGRESS NOTES
[facility]    Received request for benefit check should pt require home IV abx. Pt has coverage for IV abx through their [employer] plan. Pt has a deductible of $3200 (met $497.41). He has an 80/20 coverage. OOP is $5700 (met $485.34). Once OOP has been met pt should he covered at 100%.     [facility] has no line preference.     I spoke with Ge to introduce home infusion services and review benefits. He would like to proceed with [facility] for home IV abx needs. I will do teaching with pt and his wife at bedside after his midline has been placed.     Thank you for the referral.    Raeann Monsivais RN  [facility] Liaison  [phone] (Mon thru Fri 8am - 5pm)  [phone] Office

## 2024-05-13 NOTE — PROGRESS NOTES
Pt has coverage for iv abx through their [employer] plan. Pt has a deductible of $3200 (met $497.41). He has an 80/20 coverage. OOP is $5700 (met $485.34). Once OOP has been met pt should he covered at 100%.     ([facility]) In reference to admission date 05/10/2024.    Please contact Intake with any questions, [phone] or In Basket pool,  Home Infusion ([phone]).

## 2024-05-13 NOTE — PLAN OF CARE
Goal Outcome Evaluation:      Plan of Care Reviewed With: patient, spouse    Overall Patient Progress: improvingOverall Patient Progress: improving    Orientation: Alert and oriented x4.  VSS, hypertensive. 97% on RA.   LS: Clear and equal bilaterally.   GI: Patient is passing gas. Regular bowel movements per pt. Denies N/V.   : Adequate urine output. Pt ambulating in room independently as needed.   Skin: Incision to L index finger. Dressing changed by provider this morning, dressing is c/d/I. CMS intact.   Activity: Independent. Pt ambulating in room independently as needed. Pt up in chair most of shift.   Pain: 0/10. Pt denies pain. Pt endorses some tenderness to L digit but declines PRN medication. Pt comfortable with scheduled Tylenol.   Updates/Plan: Pt had midline placed this shift by vascular access team. Home infusion nurse came to hospital this shift to give patient and his wife home infusion education and answer all questions. First dose of abx will be delivered to patient house this evening and first dose will be due tomorrow morning. Pt met goals for discharge. This RN provided discharge instructions to the patient and his wife whom both expressed verbal understanding and asked appropriate questions. This RN also provided discharge medication administration education to the patient and his wife whom both expressed understanding via teach back method. All discharge medications given to patient prior to discharge. Patient discharging home via private vehicle with his wife as his . No concerns at this time.     Problem: Adult Inpatient Plan of Care  Goal: Plan of Care Review  Description: The Plan of Care Review/Shift note should be completed every shift.  The Outcome Evaluation is a brief statement about your assessment that the patient is improving, declining, or no change.  This information will be displayed automatically on your shift  note.  Outcome: Met  Flowsheets (Taken 5/13/2024 9636)  Plan  "of Care Reviewed With:   patient   spouse  Overall Patient Progress: improving  Goal: Patient-Specific Goal (Individualized)  Description: You can add care plan individualizations to a care plan. Examples of Individualization might be:  \"Parent requests to be called daily at 9am for status\", \"I have a hard time hearing out of my right ear\", or \"Do not touch me to wake me up as it startles  me\".  Outcome: Met  Goal: Absence of Hospital-Acquired Illness or Injury  Outcome: Met  Intervention: Identify and Manage Fall Risk  Recent Flowsheet Documentation  Taken 5/13/2024 0818 by Tracee Moses RN  Safety Promotion/Fall Prevention:   clutter free environment maintained   lighting adjusted   nonskid shoes/slippers when out of bed   room near nurse's station   room organization consistent   safety round/check completed   treat underlying cause  Intervention: Prevent Skin Injury  Recent Flowsheet Documentation  Taken 5/13/2024 1200 by Tracee Moses RN  Body Position: position changed independently  Taken 5/13/2024 0818 by Tracee Moses RN  Body Position: position changed independently  Skin Protection: adhesive use limited  Device Skin Pressure Protection:   adhesive use limited   tubing/devices free from skin contact  Intervention: Prevent Infection  Recent Flowsheet Documentation  Taken 5/13/2024 0818 by Tracee Moses RN  Infection Prevention:   cohorting utilized   environmental surveillance performed   equipment surfaces disinfected   hand hygiene promoted   personal protective equipment utilized   rest/sleep promoted   single patient room provided   visitors restricted/screened  Goal: Optimal Comfort and Wellbeing  Outcome: Met  Goal: Readiness for Transition of Care  Outcome: Met     Problem: Infection  Goal: Absence of Infection Signs and Symptoms  Outcome: Met  Intervention: Prevent or Manage Infection  Recent Flowsheet Documentation  Taken 5/13/2024 0818 by Tracee Moses RN  Infection Management: " aseptic technique maintained     Problem: Skin Injury Risk Increased  Goal: Skin Health and Integrity  Outcome: Met  Intervention: Optimize Skin Protection  Recent Flowsheet Documentation  Taken 5/13/2024 0818 by Tracee Moses RN  Skin Protection: adhesive use limited  Activity Management: activity adjusted per tolerance

## 2024-05-13 NOTE — PLAN OF CARE
"Goal Outcome Evaluation:      Plan of Care Reviewed With: patient    Overall Patient Progress: improvingOverall Patient Progress: improving    VSS. RA. A/O x4. Independent in room. Denies pain and nausea. Taking scheduled tylenol. CMS intact. Finger/hand dressing CDI. Pt sleeping between cares. Plan for midline or PICC placement today for home infusions.     Problem: Adult Inpatient Plan of Care  Goal: Plan of Care Review  Description: The Plan of Care Review/Shift note should be completed every shift.  The Outcome Evaluation is a brief statement about your assessment that the patient is improving, declining, or no change.  This information will be displayed automatically on your shift  note.  Outcome: Progressing  Flowsheets (Taken 5/13/2024 0644)  Plan of Care Reviewed With: patient  Overall Patient Progress: improving  Goal: Patient-Specific Goal (Individualized)  Description: You can add care plan individualizations to a care plan. Examples of Individualization might be:  \"Parent requests to be called daily at 9am for status\", \"I have a hard time hearing out of my right ear\", or \"Do not touch me to wake me up as it startles  me\".  Outcome: Progressing  Goal: Absence of Hospital-Acquired Illness or Injury  Outcome: Progressing  Intervention: Identify and Manage Fall Risk  Recent Flowsheet Documentation  Taken 5/12/2024 2000 by Connie Celestin, RN  Safety Promotion/Fall Prevention:   nonskid shoes/slippers when out of bed   patient and family education   room near nurse's station   room organization consistent   safety round/check completed   treat reversible contributory factors   treat underlying cause  Intervention: Prevent Skin Injury  Recent Flowsheet Documentation  Taken 5/12/2024 2200 by Connie Celestin, RN  Body Position: position changed independently  Taken 5/12/2024 2000 by Connie Celestin, RN  Body Position: position changed independently  Skin Protection: adhesive use limited  Device Skin Pressure " Protection:   adhesive use limited   tubing/devices free from skin contact  Goal: Optimal Comfort and Wellbeing  Outcome: Progressing  Goal: Readiness for Transition of Care  Outcome: Progressing     Problem: Infection  Goal: Absence of Infection Signs and Symptoms  Outcome: Progressing  Intervention: Prevent or Manage Infection  Recent Flowsheet Documentation  Taken 5/12/2024 2000 by Connie Celestin, RN  Infection Management: aseptic technique maintained     Problem: Skin Injury Risk Increased  Goal: Skin Health and Integrity  Outcome: Progressing  Intervention: Optimize Skin Protection  Recent Flowsheet Documentation  Taken 5/12/2024 2200 by Connie Celestin, RN  Head of Bed (HOB) Positioning: HOB flat  Taken 5/12/2024 2000 by Connie Celestin, RN  Skin Protection: adhesive use limited  Activity Management: activity adjusted per tolerance

## 2024-05-13 NOTE — PROGRESS NOTES
Orthopedic Surgery  Ge Wheatwdaine  05/13/2024     Admit Date:  5/10/2024    POD: 3 Days Post-Op   Procedure(s):  Irrigation and Debridement of left flexor sheath    Patient resting comfortably in bed.   Patient hoping to receive midline today and discharge home.   Pain controlled and minimal to the left hand.  Occasional tingling to left index fingertip.  Denies subjective fever or chills.  Tolerating oral intake.    Denies nausea or vomiting.  Denies chest pain or shortness of breath.  NAEO.    Temp:  [97.6  F (36.4  C)-98.4  F (36.9  C)] 97.6  F (36.4  C)  Pulse:  [70-98] 70  Resp:  [18-20] 18  BP: (142-161)/(74-79) 161/79  SpO2:  [96 %-97 %] 97 %    Alert and oriented. NAD. Non-toxic appearing. Breathing comfortably ORA.  Left hand dressing was starting to migrate distally. Dressing removed and reveals intact surgical sites with sutures to the palmar aspect of the left index finger. No dehiscence. No active or expressible drainage or purulence. Minimal erythema of the surrounding skin.   Left upper extremity is NVI.  Able to flex, extend, abduct, and adduct 1st, 3rd-5th digits.  Difficulty with active left index finger flexion at the PIP and DIP joints. Able to actively flex and extend the MCP joint but limited. No pain with passive left index finger flexion and extension.  Radial pulse palpable.  Digits are warm.  Capillary refill <2 seconds.  Sensation intact to light touch in the median, ulnar, and radial nerve distributions.    Labs/Imaging:  Recent Labs   Lab Test 05/12/24  0758 05/11/24  0817 05/10/24  1425   WBC  --   --  7.1   HGB 12.3* 13.2* 13.3   PLT  --   --  173     All cultures:  Recent Labs   Lab 05/10/24  2130   CULTURE No anaerobic organisms isolated after 2 days  No growth after 1 day     A/P    S/p left index finger irrigation and debridement of flexor tendon sheath (DOS: 5/10/24)  -Per operative report, there was luly purulence in the left index finger flexor tendon sheath and loss of A2  pulley from infection. Reviewed these findings with the patient today. Did discuss that he might have some limitation of left index finger flexion as well as occasional snapping due to loss of the A2 pulley. Patient will require hand therapy outpatient. He understands and agrees with this plan.  -Antibiotics per ID. Patient still needs a midline to be placed.   -Continue SCDs, mobilization, and ASA 81 mg daily x 30 days for DVT prophylaxis.    -No lifting, gripping, grasping involving the left index finger. Encourage frequent left index finger (and remaining digits and wrist) ROM.   -Continue current pain regimen.  -Dressings: Left hand cleansed with alcohol wipes today. New dressing placed to the left hand including Adaptic, gauze, cling wrap, and Coban. Dressing to remain in place until follow up, but patient may reinforce or contact surgical team if questions or concerns arise.  -Follow-up: 1 week postop with Dr. Nadiya Pereyra/Shahrzad Nguyen PA-C     2.  Disposition  -Anticipate d/c to home with OPIV via midline when medically cleared and midline in place. Ortho stable.    Judith Escobedo PA-C  [facility]

## 2024-05-13 NOTE — DISCHARGE INSTRUCTIONS
[facility] will be providing your infusion services. Their phone number is [phone].

## 2024-05-16 ENCOUNTER — LAB REQUISITION (OUTPATIENT)
Dept: LAB | Facility: CLINIC | Age: 64
End: 2024-05-16
Payer: COMMERCIAL

## 2024-05-16 DIAGNOSIS — M65.90 SYNOVITIS AND TENOSYNOVITIS, UNSPECIFIED: ICD-10-CM

## 2024-05-16 LAB
BASOPHILS # BLD AUTO: 0 10E3/UL (ref 0–0.2)
BASOPHILS NFR BLD AUTO: 0 %
CK SERPL-CCNC: 49 U/L (ref 39–308)
CREAT SERPL-MCNC: 0.75 MG/DL (ref 0.67–1.17)
EGFRCR SERPLBLD CKD-EPI 2021: >90 ML/MIN/1.73M2
EOSINOPHIL # BLD AUTO: 0.3 10E3/UL (ref 0–0.7)
EOSINOPHIL NFR BLD AUTO: 4 %
ERYTHROCYTE [DISTWIDTH] IN BLOOD BY AUTOMATED COUNT: 12.6 % (ref 10–15)
HCT VFR BLD AUTO: 41.2 % (ref 40–53)
HGB BLD-MCNC: 14.7 G/DL (ref 13.3–17.7)
IMM GRANULOCYTES # BLD: 0 10E3/UL
IMM GRANULOCYTES NFR BLD: 0 %
LYMPHOCYTES # BLD AUTO: 1.5 10E3/UL (ref 0.8–5.3)
LYMPHOCYTES NFR BLD AUTO: 16 %
MCH RBC QN AUTO: 30.8 PG (ref 26.5–33)
MCHC RBC AUTO-ENTMCNC: 35.7 G/DL (ref 31.5–36.5)
MCV RBC AUTO: 86 FL (ref 78–100)
MONOCYTES # BLD AUTO: 0.9 10E3/UL (ref 0–1.3)
MONOCYTES NFR BLD AUTO: 9 %
NEUTROPHILS # BLD AUTO: 6.5 10E3/UL (ref 1.6–8.3)
NEUTROPHILS NFR BLD AUTO: 71 %
NRBC # BLD AUTO: 0 10E3/UL
NRBC BLD AUTO-RTO: 0 /100
PLATELET # BLD AUTO: 217 10E3/UL (ref 150–450)
RBC # BLD AUTO: 4.77 10E6/UL (ref 4.4–5.9)
WBC # BLD AUTO: 9.2 10E3/UL (ref 4–11)

## 2024-05-16 PROCEDURE — 82565 ASSAY OF CREATININE: CPT | Performed by: SPECIALIST

## 2024-05-16 PROCEDURE — 82550 ASSAY OF CK (CPK): CPT | Performed by: SPECIALIST

## 2024-05-16 PROCEDURE — 85048 AUTOMATED LEUKOCYTE COUNT: CPT | Performed by: SPECIALIST

## 2024-05-18 LAB — BACTERIA SPEC CULT: NORMAL

## 2024-05-23 ENCOUNTER — LAB REQUISITION (OUTPATIENT)
Dept: LAB | Facility: CLINIC | Age: 64
End: 2024-05-23
Payer: COMMERCIAL

## 2024-05-23 DIAGNOSIS — M65.90 SYNOVITIS AND TENOSYNOVITIS, UNSPECIFIED: ICD-10-CM

## 2024-05-23 LAB
BASOPHILS # BLD AUTO: 0 10E3/UL (ref 0–0.2)
BASOPHILS NFR BLD AUTO: 0 %
CK SERPL-CCNC: 56 U/L (ref 39–308)
CREAT SERPL-MCNC: 0.82 MG/DL (ref 0.67–1.17)
EGFRCR SERPLBLD CKD-EPI 2021: >90 ML/MIN/1.73M2
EOSINOPHIL # BLD AUTO: 0.3 10E3/UL (ref 0–0.7)
EOSINOPHIL NFR BLD AUTO: 3 %
ERYTHROCYTE [DISTWIDTH] IN BLOOD BY AUTOMATED COUNT: 12.7 % (ref 10–15)
HCT VFR BLD AUTO: 41.5 % (ref 40–53)
HGB BLD-MCNC: 14.5 G/DL (ref 13.3–17.7)
IMM GRANULOCYTES # BLD: 0 10E3/UL
IMM GRANULOCYTES NFR BLD: 0 %
LYMPHOCYTES # BLD AUTO: 1.3 10E3/UL (ref 0.8–5.3)
LYMPHOCYTES NFR BLD AUTO: 14 %
MCH RBC QN AUTO: 30.5 PG (ref 26.5–33)
MCHC RBC AUTO-ENTMCNC: 34.9 G/DL (ref 31.5–36.5)
MCV RBC AUTO: 87 FL (ref 78–100)
MONOCYTES # BLD AUTO: 0.6 10E3/UL (ref 0–1.3)
MONOCYTES NFR BLD AUTO: 7 %
NEUTROPHILS # BLD AUTO: 7.2 10E3/UL (ref 1.6–8.3)
NEUTROPHILS NFR BLD AUTO: 76 %
NRBC # BLD AUTO: 0 10E3/UL
NRBC BLD AUTO-RTO: 0 /100
PLATELET # BLD AUTO: 217 10E3/UL (ref 150–450)
RBC # BLD AUTO: 4.75 10E6/UL (ref 4.4–5.9)
WBC # BLD AUTO: 9.5 10E3/UL (ref 4–11)

## 2024-05-23 PROCEDURE — 82565 ASSAY OF CREATININE: CPT | Performed by: SPECIALIST

## 2024-05-23 PROCEDURE — 85025 COMPLETE CBC W/AUTO DIFF WBC: CPT | Performed by: SPECIALIST

## 2024-05-23 PROCEDURE — 82550 ASSAY OF CK (CPK): CPT | Performed by: SPECIALIST

## (undated) DEVICE — CAST BUCKET

## (undated) DEVICE — BAG CLEAR TRASH 1.3M 39X33" P4040C

## (undated) DEVICE — DRSG ABDOMINAL 07 1/2X8" 7197D

## (undated) DEVICE — SOL WATER IRRIG 1000ML BOTTLE 2F7114

## (undated) DEVICE — SU ETHILON 4-0 PS-2 18" BLACK 1667H

## (undated) DEVICE — PACK HAND SOP32HARMO

## (undated) DEVICE — DRSG ADAPTIC 3X8" 6113

## (undated) DEVICE — PREP POVIDONE-IODINE 7.5% SCRUB 4OZ BOTTLE MDS093945

## (undated) DEVICE — LINEN FULL SHEET 5511

## (undated) DEVICE — CAST PADDING 4" STERILE 9044S

## (undated) DEVICE — SUCTION CANISTER MEDIVAC LINER 3000ML W/LID 65651-530

## (undated) DEVICE — TUBING SUCTION 12"X1/4" N612

## (undated) DEVICE — LINEN HALF SHEET 5512

## (undated) DEVICE — TOURNIQUET SGL BLADDER 18"X4" RED 5921-218-135

## (undated) DEVICE — DRSG KERLIX 4 1/2"X4YDS ROLL 6730

## (undated) DEVICE — TRAY PREP DRY SKIN SCRUB 067

## (undated) DEVICE — PREP POVIDONE IODINE SOLUTION 10% 4OZ BOTTLE 29906-004

## (undated) RX ORDER — CEFAZOLIN SODIUM/WATER 2 G/20 ML
SYRINGE (ML) INTRAVENOUS
Status: DISPENSED
Start: 2024-05-10

## (undated) RX ORDER — ACETAMINOPHEN 325 MG/1
TABLET ORAL
Status: DISPENSED
Start: 2024-05-10

## (undated) RX ORDER — ONDANSETRON 2 MG/ML
INJECTION INTRAMUSCULAR; INTRAVENOUS
Status: DISPENSED
Start: 2024-05-10

## (undated) RX ORDER — FENTANYL CITRATE 50 UG/ML
INJECTION, SOLUTION INTRAMUSCULAR; INTRAVENOUS
Status: DISPENSED
Start: 2024-05-10

## (undated) RX ORDER — DEXAMETHASONE SODIUM PHOSPHATE 4 MG/ML
INJECTION, SOLUTION INTRA-ARTICULAR; INTRALESIONAL; INTRAMUSCULAR; INTRAVENOUS; SOFT TISSUE
Status: DISPENSED
Start: 2024-05-10

## (undated) RX ORDER — GLYCOPYRROLATE 0.2 MG/ML
INJECTION, SOLUTION INTRAMUSCULAR; INTRAVENOUS
Status: DISPENSED
Start: 2024-05-10

## (undated) RX ORDER — LIDOCAINE HYDROCHLORIDE 10 MG/ML
INJECTION, SOLUTION EPIDURAL; INFILTRATION; INTRACAUDAL; PERINEURAL
Status: DISPENSED
Start: 2024-05-10

## (undated) RX ORDER — BUPIVACAINE HYDROCHLORIDE 2.5 MG/ML
INJECTION, SOLUTION EPIDURAL; INFILTRATION; INTRACAUDAL
Status: DISPENSED
Start: 2024-05-10

## (undated) RX ORDER — HYDRALAZINE HYDROCHLORIDE 20 MG/ML
INJECTION INTRAMUSCULAR; INTRAVENOUS
Status: DISPENSED
Start: 2024-05-10

## (undated) RX ORDER — LABETALOL HYDROCHLORIDE 5 MG/ML
INJECTION, SOLUTION INTRAVENOUS
Status: DISPENSED
Start: 2024-05-10

## (undated) RX ORDER — PROPOFOL 10 MG/ML
INJECTION, EMULSION INTRAVENOUS
Status: DISPENSED
Start: 2024-05-10

## (undated) RX ORDER — GINSENG 100 MG
CAPSULE ORAL
Status: DISPENSED
Start: 2024-05-10